# Patient Record
Sex: MALE | Race: WHITE | NOT HISPANIC OR LATINO | Employment: UNEMPLOYED | URBAN - METROPOLITAN AREA
[De-identification: names, ages, dates, MRNs, and addresses within clinical notes are randomized per-mention and may not be internally consistent; named-entity substitution may affect disease eponyms.]

---

## 2020-01-29 ENCOUNTER — APPOINTMENT (EMERGENCY)
Dept: CT IMAGING | Facility: HOSPITAL | Age: 25
DRG: 897 | End: 2020-01-29
Payer: COMMERCIAL

## 2020-01-29 ENCOUNTER — HOSPITAL ENCOUNTER (INPATIENT)
Facility: HOSPITAL | Age: 25
LOS: 1 days | Discharge: HOME | DRG: 897 | End: 2020-01-30
Attending: EMERGENCY MEDICINE | Admitting: INTERNAL MEDICINE
Payer: COMMERCIAL

## 2020-01-29 ENCOUNTER — APPOINTMENT (INPATIENT)
Dept: MRI IMAGING | Facility: HOSPITAL | Age: 25
DRG: 897 | End: 2020-01-29
Payer: COMMERCIAL

## 2020-01-29 DIAGNOSIS — Z87.898 HISTORY OF SEIZURE: ICD-10-CM

## 2020-01-29 DIAGNOSIS — F10.239 ALCOHOL WITHDRAWAL (HCC): Primary | ICD-10-CM

## 2020-01-29 PROBLEM — F17.200 NICOTINE DEPENDENCE: Status: ACTIVE | Noted: 2020-01-29

## 2020-01-29 PROBLEM — F11.11 HISTORY OF HEROIN ABUSE (HCC): Status: ACTIVE | Noted: 2020-01-29

## 2020-01-29 PROBLEM — F10.10 ALCOHOL ABUSE: Status: ACTIVE | Noted: 2020-01-29

## 2020-01-29 PROBLEM — F10.939 ALCOHOL WITHDRAWAL (HCC): Status: ACTIVE | Noted: 2020-01-29

## 2020-01-29 LAB
ALBUMIN SERPL BCP-MCNC: 4.3 G/DL (ref 3.5–5)
ALP SERPL-CCNC: 68 U/L (ref 46–116)
ALT SERPL W P-5'-P-CCNC: 25 U/L (ref 12–78)
ANION GAP SERPL CALCULATED.3IONS-SCNC: 10 MMOL/L (ref 4–13)
AST SERPL W P-5'-P-CCNC: 30 U/L (ref 5–45)
ATRIAL RATE: 76 BPM
BASOPHILS # BLD AUTO: 0.04 THOUSANDS/ΜL (ref 0–0.1)
BASOPHILS NFR BLD AUTO: 1 % (ref 0–1)
BILIRUB SERPL-MCNC: 1.6 MG/DL (ref 0.2–1)
BUN SERPL-MCNC: 9 MG/DL (ref 5–25)
CALCIUM SERPL-MCNC: 9.1 MG/DL (ref 8.3–10.1)
CHLORIDE SERPL-SCNC: 100 MMOL/L (ref 100–108)
CO2 SERPL-SCNC: 29 MMOL/L (ref 21–32)
CREAT SERPL-MCNC: 0.9 MG/DL (ref 0.6–1.3)
EOSINOPHIL # BLD AUTO: 0.15 THOUSAND/ΜL (ref 0–0.61)
EOSINOPHIL NFR BLD AUTO: 3 % (ref 0–6)
ERYTHROCYTE [DISTWIDTH] IN BLOOD BY AUTOMATED COUNT: 11.6 % (ref 11.6–15.1)
GFR SERPL CREATININE-BSD FRML MDRD: 119 ML/MIN/1.73SQ M
GLUCOSE SERPL-MCNC: 87 MG/DL (ref 65–140)
HCT VFR BLD AUTO: 41.8 % (ref 36.5–49.3)
HGB BLD-MCNC: 14.8 G/DL (ref 12–17)
IMM GRANULOCYTES # BLD AUTO: 0.01 THOUSAND/UL (ref 0–0.2)
IMM GRANULOCYTES NFR BLD AUTO: 0 % (ref 0–2)
LYMPHOCYTES # BLD AUTO: 0.78 THOUSANDS/ΜL (ref 0.6–4.47)
LYMPHOCYTES NFR BLD AUTO: 14 % (ref 14–44)
MAGNESIUM SERPL-MCNC: 1.8 MG/DL (ref 1.6–2.6)
MCH RBC QN AUTO: 31.2 PG (ref 26.8–34.3)
MCHC RBC AUTO-ENTMCNC: 35.4 G/DL (ref 31.4–37.4)
MCV RBC AUTO: 88 FL (ref 82–98)
MONOCYTES # BLD AUTO: 0.64 THOUSAND/ΜL (ref 0.17–1.22)
MONOCYTES NFR BLD AUTO: 11 % (ref 4–12)
NEUTROPHILS # BLD AUTO: 4.1 THOUSANDS/ΜL (ref 1.85–7.62)
NEUTS SEG NFR BLD AUTO: 71 % (ref 43–75)
NRBC BLD AUTO-RTO: 0 /100 WBCS
P AXIS: 52 DEGREES
PHOSPHATE SERPL-MCNC: 3.3 MG/DL (ref 2.7–4.5)
PLATELET # BLD AUTO: 201 THOUSANDS/UL (ref 149–390)
PMV BLD AUTO: 9.2 FL (ref 8.9–12.7)
POTASSIUM SERPL-SCNC: 3.3 MMOL/L (ref 3.5–5.3)
PR INTERVAL: 108 MS
PROT SERPL-MCNC: 7.8 G/DL (ref 6.4–8.2)
QRS AXIS: 93 DEGREES
QRSD INTERVAL: 84 MS
QT INTERVAL: 390 MS
QTC INTERVAL: 438 MS
RBC # BLD AUTO: 4.74 MILLION/UL (ref 3.88–5.62)
SODIUM SERPL-SCNC: 139 MMOL/L (ref 136–145)
T WAVE AXIS: 79 DEGREES
VENTRICULAR RATE: 76 BPM
WBC # BLD AUTO: 5.72 THOUSAND/UL (ref 4.31–10.16)

## 2020-01-29 PROCEDURE — 70553 MRI BRAIN STEM W/O & W/DYE: CPT

## 2020-01-29 PROCEDURE — 93010 ELECTROCARDIOGRAM REPORT: CPT | Performed by: INTERNAL MEDICINE

## 2020-01-29 PROCEDURE — 80053 COMPREHEN METABOLIC PANEL: CPT | Performed by: EMERGENCY MEDICINE

## 2020-01-29 PROCEDURE — 99223 1ST HOSP IP/OBS HIGH 75: CPT | Performed by: INTERNAL MEDICINE

## 2020-01-29 PROCEDURE — 83735 ASSAY OF MAGNESIUM: CPT | Performed by: EMERGENCY MEDICINE

## 2020-01-29 PROCEDURE — 85025 COMPLETE CBC W/AUTO DIFF WBC: CPT | Performed by: EMERGENCY MEDICINE

## 2020-01-29 PROCEDURE — 36415 COLL VENOUS BLD VENIPUNCTURE: CPT | Performed by: EMERGENCY MEDICINE

## 2020-01-29 PROCEDURE — 96374 THER/PROPH/DIAG INJ IV PUSH: CPT

## 2020-01-29 PROCEDURE — A9585 GADOBUTROL INJECTION: HCPCS | Performed by: PHYSICIAN ASSISTANT

## 2020-01-29 PROCEDURE — 96361 HYDRATE IV INFUSION ADD-ON: CPT

## 2020-01-29 PROCEDURE — 93005 ELECTROCARDIOGRAM TRACING: CPT

## 2020-01-29 PROCEDURE — 84100 ASSAY OF PHOSPHORUS: CPT | Performed by: EMERGENCY MEDICINE

## 2020-01-29 PROCEDURE — 70450 CT HEAD/BRAIN W/O DYE: CPT

## 2020-01-29 PROCEDURE — 99285 EMERGENCY DEPT VISIT HI MDM: CPT | Performed by: EMERGENCY MEDICINE

## 2020-01-29 PROCEDURE — 99285 EMERGENCY DEPT VISIT HI MDM: CPT

## 2020-01-29 RX ORDER — FOLIC ACID 1 MG/1
1 TABLET ORAL DAILY
Status: DISCONTINUED | OUTPATIENT
Start: 2020-01-30 | End: 2020-01-30 | Stop reason: HOSPADM

## 2020-01-29 RX ORDER — NICOTINE 21 MG/24HR
1 PATCH, TRANSDERMAL 24 HOURS TRANSDERMAL DAILY
Status: DISCONTINUED | OUTPATIENT
Start: 2020-01-30 | End: 2020-01-30 | Stop reason: HOSPADM

## 2020-01-29 RX ORDER — MULTIVITAMIN/IRON/FOLIC ACID 18MG-0.4MG
1 TABLET ORAL DAILY
Status: DISCONTINUED | OUTPATIENT
Start: 2020-01-29 | End: 2020-01-30 | Stop reason: HOSPADM

## 2020-01-29 RX ORDER — DIAZEPAM 5 MG/ML
10 INJECTION, SOLUTION INTRAMUSCULAR; INTRAVENOUS ONCE
Status: COMPLETED | OUTPATIENT
Start: 2020-01-29 | End: 2020-01-29

## 2020-01-29 RX ORDER — DIAZEPAM 5 MG/ML
5 INJECTION, SOLUTION INTRAMUSCULAR; INTRAVENOUS ONCE
Status: COMPLETED | OUTPATIENT
Start: 2020-01-29 | End: 2020-01-29

## 2020-01-29 RX ORDER — NICOTINE 21 MG/24HR
14 PATCH, TRANSDERMAL 24 HOURS TRANSDERMAL ONCE
Status: DISCONTINUED | OUTPATIENT
Start: 2020-01-29 | End: 2020-01-30 | Stop reason: HOSPADM

## 2020-01-29 RX ORDER — CHLORDIAZEPOXIDE HYDROCHLORIDE 25 MG/1
50 CAPSULE, GELATIN COATED ORAL ONCE
Status: COMPLETED | OUTPATIENT
Start: 2020-01-29 | End: 2020-01-29

## 2020-01-29 RX ORDER — CHLORDIAZEPOXIDE HYDROCHLORIDE 5 MG/1
10 CAPSULE, GELATIN COATED ORAL EVERY 6 HOURS SCHEDULED
Status: DISCONTINUED | OUTPATIENT
Start: 2020-01-29 | End: 2020-01-29

## 2020-01-29 RX ORDER — CHLORDIAZEPOXIDE HYDROCHLORIDE 5 MG/1
10 CAPSULE, GELATIN COATED ORAL EVERY 6 HOURS SCHEDULED
Status: DISCONTINUED | OUTPATIENT
Start: 2020-01-29 | End: 2020-01-30

## 2020-01-29 RX ORDER — THIAMINE MONONITRATE (VIT B1) 100 MG
100 TABLET ORAL DAILY
Status: DISCONTINUED | OUTPATIENT
Start: 2020-01-29 | End: 2020-01-30 | Stop reason: HOSPADM

## 2020-01-29 RX ORDER — NICOTINE 21 MG/24HR
14 PATCH, TRANSDERMAL 24 HOURS TRANSDERMAL ONCE
Status: DISCONTINUED | OUTPATIENT
Start: 2020-01-29 | End: 2020-01-29 | Stop reason: SDUPTHER

## 2020-01-29 RX ADMIN — FOLIC ACID 1 MG: 5 INJECTION, SOLUTION INTRAMUSCULAR; INTRAVENOUS; SUBCUTANEOUS at 12:59

## 2020-01-29 RX ADMIN — CHLORDIAZEPOXIDE HYDROCHLORIDE 10 MG: 5 CAPSULE ORAL at 15:08

## 2020-01-29 RX ADMIN — NICOTINE 14 MG: 14 PATCH TRANSDERMAL at 09:39

## 2020-01-29 RX ADMIN — THIAMINE HYDROCHLORIDE 100 MG: 100 INJECTION, SOLUTION INTRAMUSCULAR; INTRAVENOUS at 11:52

## 2020-01-29 RX ADMIN — GADOBUTROL 5 ML: 604.72 INJECTION INTRAVENOUS at 19:36

## 2020-01-29 RX ADMIN — CHLORDIAZEPOXIDE HYDROCHLORIDE 50 MG: 25 CAPSULE ORAL at 09:22

## 2020-01-29 RX ADMIN — DIAZEPAM 10 MG: 10 INJECTION, SOLUTION INTRAMUSCULAR; INTRAVENOUS at 09:22

## 2020-01-29 RX ADMIN — SODIUM CHLORIDE 1000 ML: 0.9 INJECTION, SOLUTION INTRAVENOUS at 09:25

## 2020-01-29 RX ADMIN — DIAZEPAM 5 MG: 10 INJECTION, SOLUTION INTRAMUSCULAR; INTRAVENOUS at 09:39

## 2020-01-29 RX ADMIN — Medication 1 TABLET: at 13:27
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
## 2020-01-29 NOTE — ED NOTES
Patient returned from x-ray, resting comfortably  Side rails up x 2, call light in reach        Gael Dill RN  01/29/20 8597

## 2020-01-29 NOTE — ED NOTES
1221 [facility] notified  [facility] Notice from [place] will be in to see patient         Alcira Hilton RN  01/29/20 4645

## 2020-01-30 ENCOUNTER — APPOINTMENT (INPATIENT)
Dept: NEUROLOGY | Facility: HOSPITAL | Age: 25
DRG: 897 | End: 2020-01-30
Payer: COMMERCIAL

## 2020-01-30 VITALS
BODY MASS INDEX: 20.4 KG/M2 | TEMPERATURE: 98 F | HEIGHT: 67 IN | OXYGEN SATURATION: 98 % | DIASTOLIC BLOOD PRESSURE: 70 MMHG | SYSTOLIC BLOOD PRESSURE: 111 MMHG | WEIGHT: 130 LBS | HEART RATE: 72 BPM | RESPIRATION RATE: 18 BRPM

## 2020-01-30 LAB
ANION GAP SERPL CALCULATED.3IONS-SCNC: 6 MMOL/L (ref 4–13)
BASOPHILS # BLD AUTO: 0.04 THOUSANDS/ΜL (ref 0–0.1)
BASOPHILS NFR BLD AUTO: 1 % (ref 0–1)
BUN SERPL-MCNC: 7 MG/DL (ref 5–25)
CALCIUM SERPL-MCNC: 8.9 MG/DL (ref 8.3–10.1)
CHLORIDE SERPL-SCNC: 106 MMOL/L (ref 100–108)
CO2 SERPL-SCNC: 28 MMOL/L (ref 21–32)
CREAT SERPL-MCNC: 0.73 MG/DL (ref 0.6–1.3)
EOSINOPHIL # BLD AUTO: 0.31 THOUSAND/ΜL (ref 0–0.61)
EOSINOPHIL NFR BLD AUTO: 7 % (ref 0–6)
ERYTHROCYTE [DISTWIDTH] IN BLOOD BY AUTOMATED COUNT: 11.8 % (ref 11.6–15.1)
GFR SERPL CREATININE-BSD FRML MDRD: 130 ML/MIN/1.73SQ M
GLUCOSE SERPL-MCNC: 96 MG/DL (ref 65–140)
HCT VFR BLD AUTO: 39 % (ref 36.5–49.3)
HGB BLD-MCNC: 13.5 G/DL (ref 12–17)
IMM GRANULOCYTES # BLD AUTO: 0.01 THOUSAND/UL (ref 0–0.2)
IMM GRANULOCYTES NFR BLD AUTO: 0 % (ref 0–2)
LYMPHOCYTES # BLD AUTO: 1.17 THOUSANDS/ΜL (ref 0.6–4.47)
LYMPHOCYTES NFR BLD AUTO: 28 % (ref 14–44)
MCH RBC QN AUTO: 30.8 PG (ref 26.8–34.3)
MCHC RBC AUTO-ENTMCNC: 34.6 G/DL (ref 31.4–37.4)
MCV RBC AUTO: 89 FL (ref 82–98)
MONOCYTES # BLD AUTO: 0.41 THOUSAND/ΜL (ref 0.17–1.22)
MONOCYTES NFR BLD AUTO: 10 % (ref 4–12)
NEUTROPHILS # BLD AUTO: 2.26 THOUSANDS/ΜL (ref 1.85–7.62)
NEUTS SEG NFR BLD AUTO: 54 % (ref 43–75)
NRBC BLD AUTO-RTO: 0 /100 WBCS
PLATELET # BLD AUTO: 161 THOUSANDS/UL (ref 149–390)
PMV BLD AUTO: 9.1 FL (ref 8.9–12.7)
POTASSIUM SERPL-SCNC: 3.3 MMOL/L (ref 3.5–5.3)
RBC # BLD AUTO: 4.38 MILLION/UL (ref 3.88–5.62)
SODIUM SERPL-SCNC: 140 MMOL/L (ref 136–145)
WBC # BLD AUTO: 4.2 THOUSAND/UL (ref 4.31–10.16)

## 2020-01-30 PROCEDURE — 95816 EEG AWAKE AND DROWSY: CPT

## 2020-01-30 PROCEDURE — 36415 COLL VENOUS BLD VENIPUNCTURE: CPT | Performed by: PHYSICIAN ASSISTANT

## 2020-01-30 PROCEDURE — 85025 COMPLETE CBC W/AUTO DIFF WBC: CPT | Performed by: PHYSICIAN ASSISTANT

## 2020-01-30 PROCEDURE — 80048 BASIC METABOLIC PNL TOTAL CA: CPT | Performed by: PHYSICIAN ASSISTANT

## 2020-01-30 PROCEDURE — 95819 EEG AWAKE AND ASLEEP: CPT | Performed by: PSYCHIATRY & NEUROLOGY

## 2020-01-30 PROCEDURE — 99239 HOSP IP/OBS DSCHRG MGMT >30: CPT | Performed by: INTERNAL MEDICINE

## 2020-01-30 PROCEDURE — 99223 1ST HOSP IP/OBS HIGH 75: CPT | Performed by: NURSE PRACTITIONER

## 2020-01-30 RX ORDER — LANOLIN ALCOHOL/MO/W.PET/CERES
100 CREAM (GRAM) TOPICAL DAILY
Qty: 30 TABLET | Refills: 0 | Status: SHIPPED | OUTPATIENT
Start: 2020-01-31 | End: 2020-04-11 | Stop reason: ALTCHOICE

## 2020-01-30 RX ORDER — CHLORDIAZEPOXIDE HYDROCHLORIDE 10 MG/1
CAPSULE, GELATIN COATED ORAL
Qty: 10 CAPSULE | Refills: 0 | Status: SHIPPED | OUTPATIENT
Start: 2020-01-30 | End: 2020-04-11 | Stop reason: ALTCHOICE

## 2020-01-30 RX ORDER — POTASSIUM CHLORIDE 20 MEQ/1
40 TABLET, EXTENDED RELEASE ORAL ONCE
Status: COMPLETED | OUTPATIENT
Start: 2020-01-30 | End: 2020-01-30

## 2020-01-30 RX ORDER — CHLORDIAZEPOXIDE HYDROCHLORIDE 5 MG/1
10 CAPSULE, GELATIN COATED ORAL EVERY 8 HOURS SCHEDULED
Status: DISCONTINUED | OUTPATIENT
Start: 2020-01-30 | End: 2020-01-30 | Stop reason: HOSPADM

## 2020-01-30 RX ORDER — FOLIC ACID 1 MG/1
1 TABLET ORAL DAILY
Qty: 30 TABLET | Refills: 0 | Status: SHIPPED | OUTPATIENT
Start: 2020-01-31 | End: 2020-04-11 | Stop reason: ALTCHOICE

## 2020-01-30 RX ADMIN — NICOTINE 1 PATCH: 21 PATCH, EXTENDED RELEASE TRANSDERMAL at 10:28

## 2020-01-30 RX ADMIN — POTASSIUM CHLORIDE 40 MEQ: 1500 TABLET, EXTENDED RELEASE ORAL at 12:41

## 2020-01-30 RX ADMIN — Medication 1 TABLET: at 10:27

## 2020-01-30 RX ADMIN — CHLORDIAZEPOXIDE HYDROCHLORIDE 10 MG: 5 CAPSULE ORAL at 05:55

## 2020-01-30 RX ADMIN — NICOTINE 14 MG: 14 PATCH TRANSDERMAL at 00:27

## 2020-01-30 RX ADMIN — CHLORDIAZEPOXIDE HYDROCHLORIDE 10 MG: 5 CAPSULE ORAL at 00:25

## 2020-01-30 RX ADMIN — FOLIC ACID 1 MG: 1 TABLET ORAL at 10:28

## 2020-01-30 RX ADMIN — THIAMINE HCL TAB 100 MG 100 MG: 100 TAB at 10:28

## 2020-01-30 NOTE — ED NOTES
Patient transported to [address] [place] [address], [address] [place] [address]  01/29/20 7834
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
## 2020-01-30 NOTE — ED NOTES
Pt woke up, asking about what he is waiting for and if he can go home  Pt explained that we are awaiting neurology consult  Pt interested in [facility], but worried his insurance will not pay if he goes that way  Pt walked to bathroom, given self-care items  Pt requests food and drink       Marlin Waldrop RN  01/30/20 7046
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
## 2020-01-30 NOTE — ED NOTES
Pt given 6am Librium  Resting again in bed, denies needing anything else at this time        Arabella Bell, PATRICIA  01/30/20 2091

## 2020-01-30 NOTE — UTILIZATION REVIEW
Initial Clinical Review    Admission: Date/Time/Statement: Inpatient Admission Orders (From admission, onward)     Ordered        01/29/20 1111  Inpatient Admission  Once                   Orders Placed This Encounter   Procedures    Inpatient Admission     Standing Status:   Standing     Number of Occurrences:   1     Order Specific Question:   Admitting Physician     Answer:   Devin Jose [[id number]]     Order Specific Question:   Level of Care     Answer:   Med Surg [[id number]]     Order Specific Question:   Estimated length of stay     Answer:   More than 2 Midnights     Order Specific Question:   Certification     Answer:   I certify that inpatient services are medically necessary for this patient for a duration of greater than two midnights  See H&P and MD Progress Notes for additional information about the patient's course of treatment  ED Arrival Information     Expected Arrival Acuity Means of Arrival Escorted By Service Admission Type    - 1/29/2020 08:27 Urgent Walk-In Family Member General Medicine Urgent    Arrival Complaint    seizure        Chief Complaint   Patient presents with    Alcoholic Seizure     Patient states he had seizure at 7am   He woke up feeling weird and confused and had a burning smell  Had a seizure abotu 5 years ago that he states was wellbutrin induced  Patient states he drinks a bottle of whiskey a day for several weeks and did not drink yesterday due to family trying to intervene in his drinking  Assessment/Plan: 26 yo male to ED from home admitted as Inpatient due to Alcohol withdrawal  Patient with PMHx of alcohol abuse, 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  Chose to participate in " Dry January" for 2 weeks  However, for prior 2 weeks ingesting 1 bottle of whiskey /day  Approximately 36 hours since last drink & participated in an intervention w his family   He awake smelling "burning" which occurred previously when he was on Wellbutrin & had a seizure; being confused & incoherent  This prompt him to seek ED eval  He is agitated, anxious with tremors  He has gone thru alcohol withdrawal before without seizure activity  Inpatient labs reveal elevated  Bilirubin  IN ED he received IV hydration,  IV Valium, & Librium & will begin a Librium taper  CIWA scores  Seizure precautions, consult Neurology & obtain EEG       ED Triage Vitals   Temperature Pulse Respirations Blood Pressure SpO2   01/29/20 0853 01/29/20 0853 01/29/20 0853 01/29/20 0853 01/29/20 0853   98 6 °F (37 °C) 76 18 136/82 97 %      Temp Source Heart Rate Source Patient Position - Orthostatic VS BP Location FiO2 (%)   01/29/20 2019 01/29/20 0930 01/29/20 0853 01/29/20 0853 --   Tympanic Monitor Lying Left arm       Pain Score       01/29/20 0853       No Pain        Wt Readings from Last 1 Encounters:   01/29/20 59 kg (130 lb)     Additional Vital Signs:   Date/Time  Temp  Pulse  Resp  BP  MAP (mmHg)  SpO2  O2 Device  Patient Position - Orthostatic VS   01/30/20 11:04:30  98 °F (36 7 °C)  72  18  111/70  84  98 %       01/30/20 1100              None (Room air)     01/30/20 1030    82  18  111/64    99 %  None (Room air)     01/30/20 0627    68    117/68    99 %       01/30/20 0030  98 4 °F (36 9 °C)  78  17  115/63    99 %  None (Room air)     01/29/20 2019  99 6 °F (37 6 °C)  70  20  132/76    99 %  None (Room air)  Lying   01/29/20 1618    85  18  121/73    97 %  None (Room air)  Lying   01/29/20 1347    76    118/76           01/29/20 1300    76    118/76    98 %       01/29/20 1230    80  19  109/78    99 %       01/29/20 1030    80  16  116/67    97 %  None (Room air)  Sitting   01/29/20 1000    83  14  121/79    97 %  None (Room air)  Sitting   01/29/20 0930    83  19  133/76    97 %  None (Room air)  Lying   Comment rows:   OBSERV: resting comfortably at 01/29/20 0930   01/29/20 0853  98 6 °F (37 °C)  76  18  136/82    97 %    Lying      Weights (last 14 days)     Date/Time  Weight  Weight Method  Height   01/29/20 0853  59 kg (130 lb)  Estimated  5' 7" (1 702 m)       Pertinent Labs/Diagnostic Test Results:   1/29 CIWA sores (AFTER MEDICATED IN ED) 1,1,6  Results from last 7 days   Lab Units 01/30/20  0649 01/29/20  0915   WBC Thousand/uL 4 20* 5 72   HEMOGLOBIN g/dL 13 5 14 8   HEMATOCRIT % 39 0 41 8   PLATELETS Thousands/uL 161 201   NEUTROS ABS Thousands/µL 2 26 4 10         Results from last 7 days   Lab Units 01/30/20  0650 01/29/20  0915   SODIUM mmol/L 140 139   POTASSIUM mmol/L 3 3* 3 3*   CHLORIDE mmol/L 106 100   CO2 mmol/L 28 29   ANION GAP mmol/L 6 10   BUN mg/dL 7 9   CREATININE mg/dL 0 73 0 90   EGFR ml/min/1 73sq m 130 119   CALCIUM mg/dL 8 9 9 1   MAGNESIUM mg/dL  --  1 8   PHOSPHORUS mg/dL  --  3 3     Results from last 7 days   Lab Units 01/29/20  0915   AST U/L 30   ALT U/L 25   ALK PHOS U/L 68   TOTAL PROTEIN g/dL 7 8   ALBUMIN g/dL 4 3   TOTAL BILIRUBIN mg/dL 1 60*         Results from last 7 days   Lab Units 01/30/20  0650 01/29/20  0915   GLUCOSE RANDOM mg/dL 96 87     1/29/2020  CT head without contrast   No acute intracranial abnormality     :EKG: Sinus rhythm with short DE  Rightward axis  Borderline ECG      ED Treatment:   Medication Administration from 01/29/2020 0827 to 01/30/2020 1043       Date/Time Order Dose Route Action     01/29/2020 0925 sodium chloride 0 9 % bolus 1,000 mL 1,000 mL Intravenous New Bag     01/29/2020 0922 diazepam (VALIUM) injection 10 mg 10 mg Intravenous Given     01/29/2020 0922 chlordiazePOXIDE (LIBRIUM) capsule 50 mg 50 mg Oral Given     01/30/2020 0046 nicotine (NICODERM CQ) 14 mg/24hr TD 24 hr patch 14 mg 14 mg Transdermal Patch Removed     01/29/2020 0939 nicotine (NICODERM CQ) 14 mg/24hr TD 24 hr patch 14 mg 14 mg Transdermal Medication Applied     01/29/2020 0939 diazepam (VALIUM) injection 5 mg 5 mg Intravenous Given     87/15/6795 9893 folic acid 1 mg in sodium chloride 0 9 % 50 mL IVPB 1 mg Intravenous New Bag     01/29/2020 1152 thiamine (VITAMIN B1) 100 mg in sodium chloride 0 9 % 50 mL IVPB 100 mg Intravenous New Bag     01/30/2020 1028 thiamine (VITAMIN B1) tablet 100 mg 100 mg Oral Given     01/29/2020 1345 thiamine (VITAMIN B1) tablet 100 mg 100 mg Oral Not Given     15/35/6048 2838 folic acid (FOLVITE) tablet 1 mg 1 mg Oral Given     01/30/2020 1027 multivitamin-minerals (CENTRUM ADULTS) tablet 1 tablet 1 tablet Oral Given     01/29/2020 1327 multivitamin-minerals (CENTRUM ADULTS) tablet 1 tablet 1 tablet Oral Given     01/29/2020 1508 chlordiazePOXIDE (LIBRIUM) capsule 10 mg 10 mg Oral Not Given     01/30/2020 1028 nicotine (NICODERM CQ) 21 mg/24 hr TD 24 hr patch 1 patch 1 patch Transdermal Medication Applied     01/30/2020 0555 chlordiazePOXIDE (LIBRIUM) capsule 10 mg 10 mg Oral Given     01/30/2020 0025 chlordiazePOXIDE (LIBRIUM) capsule 10 mg 10 mg Oral Given     01/29/2020 1508 chlordiazePOXIDE (LIBRIUM) capsule 10 mg 10 mg Oral Given     01/29/2020 1936 Gadobutrol injection (SINGLE-DOSE) SOLN 5 mL 5 mL Intravenous Given     01/30/2020 0027 nicotine (NICODERM CQ) 14 mg/24hr TD 24 hr patch 14 mg 14 mg Transdermal Medication Applied        History reviewed  No pertinent past medical history    Present on Admission:   Alcohol withdrawal ([place] [address] )   Nicotine dependence   History of heroin abuse ([place] [address] )      Admitting Diagnosis: Alcohol withdrawal ([place] [address] ) [F10 239]  Seizure ([place] [address] ) [R56 9]  History of seizure [Z87 898]  Age/Sex: 25 y o  male  Admission Orders:  [facility]  Continuous pulse oximetry  Seizure precautions  eeg  CIWA scoring  Vitals routine  scd  Scheduled Medications:    Medications:  chlordiazePOXIDE 10 mg Oral [address]   folic acid 1 mg Oral Daily   multivitamin-minerals 1 tablet Oral Daily   nicotine 14 mg Transdermal Once   nicotine 1 patch Transdermal Daily   thiamine 100 mg Oral Daily     Continuous IV Infusions:     PRN Meds:     Network Utilization Review Department  [email or web address]  ATTENTION: Please call with any questions or concerns to [phone] and carefully listen to the prompts so that you are directed to the right person  All voicemails are confidential   Eva Zimmer all requests for admission clinical reviews, approved or denied determinations and any other requests to dedicated fax number below belonging to the campus where the patient is receiving treatment   List of dedicated fax numbers for the Facilities:  [address] DENIALS (Administrative/Medical Necessity) [phone]   [address] (Maternity/NICU/Pediatrics) [phone]   Aaliyah Parikh [phone]   Trice Figueroa [phone]   Danielle Evens [phone]   Fabian Pelaez [phone]   [address] [place] [address] [phone]   [facility]  [phone]   [address] [facility]  [address] [facility] [address] [facility] [phone]

## 2020-01-30 NOTE — ED NOTES
Pt sleeping  Self repositions  Respirations visible  No grimacing noted on face       Ashley Garcia RN  01/30/20 1040

## 2020-01-30 NOTE — ED NOTES
Pt resting in bed  Respirations visible  Arouses to name  Denies needing anything new at this time        Sebastian Stuart, RN  01/30/20 5373

## 2020-01-30 NOTE — SOCIAL WORK
LOS: 1 Patient is not a 30 day re admission or a Medicare Bundled patient    Met with patient and reviewed the discharge planning process including identifying help at home and patient preference for dischgare  Patient resides with a roommate in a 1st floor apartment  He is independent of ADL's and uses no assistive device  He is employed and reports [employer], instructed him to bring card in as he is lisited as self pay  He uses [facility] on [address]  Patient reports a 21 day inpatient drug rehab program at [facility] years ago  Discussed referral to [facility] and he is not interested in rehab and wants to go home

## 2020-01-30 NOTE — PLAN OF CARE
Problem: Potential for Falls  Goal: Patient will remain free of falls  Description  INTERVENTIONS:  - Assess patient frequently for physical needs  -  Identify cognitive and physical deficits and behaviors that affect risk of falls    -  Wall fall precautions as indicated by assessment   - Educate patient/family on patient safety including physical limitations  - Instruct patient to call for assistance with activity based on assessment  - Modify environment to reduce risk of injury  - Consider OT/PT consult to assist with strengthening/mobility  Outcome: Progressing     Problem: DISCHARGE PLANNING  Goal: Discharge to home or other facility with appropriate resources  Description  INTERVENTIONS:  - Identify barriers to discharge w/patient and caregiver  - Arrange for needed discharge resources and transportation as appropriate  - Identify discharge learning needs (meds, wound care, etc )  - Arrange for interpretive services to assist at discharge as needed  - Refer to Case Management Department for coordinating discharge planning if the patient needs post-hospital services based on physician/advanced practitioner order or complex needs related to functional status, cognitive ability, or social support system  Outcome: Progressing

## 2020-02-14 NOTE — UTILIZATION REVIEW
Initial Clinical Review    Admission: Date/Time/Statement:   Inpatient Admission Orders (From admission, onward)     Ordered        01/29/20 1111  Inpatient Admission  Once                   Orders Placed This Encounter   Procedures    Inpatient Admission     Standing Status:   Standing     Number of Occurrences:   1     Order Specific Question:   Admitting Physician     Answer:   Davida Arcos [[id number]]     Order Specific Question:   Level of Care     Answer:   Med Surg [[id number]]     Order Specific Question:   Estimated length of stay     Answer:   More than 2 Midnights     Order Specific Question:   Certification     Answer:   I certify that inpatient services are medically necessary for this patient for a duration of greater than two midnights  See H&P and MD Progress Notes for additional information about the patient's course of treatment  ED Arrival Information     Expected Arrival Acuity Means of Arrival Escorted By Service Admission Type    - 1/29/2020 08:27 Urgent Walk-In Family Member General Medicine Urgent    Arrival Complaint    seizure        Chief Complaint   Patient presents with    Alcoholic Seizure     Patient states he had seizure at 7am   He woke up feeling weird and confused and had a burning smell  Had a seizure abotu 5 years ago that he states was wellbutrin induced  Patient states he drinks a bottle of whiskey a day for several weeks and did not drink yesterday due to family trying to intervene in his drinking  Assessment/Plan: 24 yo male to ED from home admitted as Inpatient due to Alcohol withdrawal  Patient with PMHx of alcohol abuse, 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  Chose to participate in " Dry January" for 2 weeks  However, for prior 2 weeks ingesting 1 bottle of whiskey /day  Approximately 36 hours since last drink & participated in an intervention w his family   He awake smelling "burning" which occurred previously when he was on Wellbutrin & had a seizure; being confused & incoherent  This prompt him to seek ED eval  He is agitated, anxious with tremors  He has gone thru alcohol withdrawal before without seizure activity  Inpatient labs reveal elevated  Bilirubin  IN ED he received IV hydration,  IV Valium, & Librium & will begin a Librium taper  CIWA scores  Seizure precautions, consult Neurology & obtain EEG       ED Triage Vitals   Temperature Pulse Respirations Blood Pressure SpO2   01/29/20 0853 01/29/20 0853 01/29/20 0853 01/29/20 0853 01/29/20 0853   98 6 °F (37 °C) 76 18 136/82 97 %      Temp Source Heart Rate Source Patient Position - Orthostatic VS BP Location FiO2 (%)   01/29/20 2019 01/29/20 0930 01/29/20 0853 01/29/20 0853 --   Tympanic Monitor Lying Left arm       Pain Score       01/29/20 0853       No Pain          Wt Readings from Last 1 Encounters:   01/29/20 59 kg (130 lb)     Additional Vital Signs:   Date/Time  Temp  Pulse  Resp  BP  MAP (mmHg)  SpO2  O2 Device  Patient Position - Orthostatic VS   01/30/20 11:04:30  98 °F (36 7 °C)  72  18  111/70  84  98 %       01/30/20 1100              None (Room air)     01/30/20 1030    82  18  111/64    99 %  None (Room air)     01/30/20 0627    68    117/68    99 %       01/30/20 0030  98 4 °F (36 9 °C)  78  17  115/63    99 %  None (Room air)     01/29/20 2019  99 6 °F (37 6 °C)  70  20  132/76    99 %  None (Room air)  Lying   01/29/20 1618    85  18  121/73    97 %  None (Room air)  Lying   01/29/20 1347    76    118/76           01/29/20 1300    76    118/76    98 %       01/29/20 1230    80  19  109/78    99 %       01/29/20 1030    80  16  116/67    97 %  None (Room air)  Sitting   01/29/20 1000    83  14  121/79    97 %  None (Room air)  Sitting   01/29/20 0930    83  19  133/76    97 %  None (Room air)  Lying   Comment rows:   OBSERV: resting comfortably at 01/29/20 0930   01/29/20 0853  98 6 °F (37 °C)  76  18  136/82    97 %    Lying      Weights (last 14 days)     Date/Time  Weight  Weight Method  Height   01/29/20 0853  59 kg (130 lb)  Estimated  5' 7" (1 702 m)       Pertinent Labs/Diagnostic Test Results:   1/29 CIWA sores (AFTER MEDICATED IN ED) 1,1,6                          1/29/2020  CT head without contrast   No acute intracranial abnormality     :EKG: Sinus rhythm with short OH  Rightward axis  Borderline ECG      ED Treatment:   Medication Administration from 01/29/2020 0827 to 01/30/2020 1043       Date/Time Order Dose Route Action     01/29/2020 0925 sodium chloride 0 9 % bolus 1,000 mL 1,000 mL Intravenous New Bag     01/29/2020 0922 diazepam (VALIUM) injection 10 mg 10 mg Intravenous Given     01/29/2020 0922 chlordiazePOXIDE (LIBRIUM) capsule 50 mg 50 mg Oral Given     01/30/2020 0046 nicotine (NICODERM CQ) 14 mg/24hr TD 24 hr patch 14 mg 14 mg Transdermal Patch Removed     01/29/2020 0939 nicotine (NICODERM CQ) 14 mg/24hr TD 24 hr patch 14 mg 14 mg Transdermal Medication Applied     01/29/2020 0939 diazepam (VALIUM) injection 5 mg 5 mg Intravenous Given     38/56/7850 0015 folic acid 1 mg in sodium chloride 0 9 % 50 mL IVPB 1 mg Intravenous New Bag     01/29/2020 1152 thiamine (VITAMIN B1) 100 mg in sodium chloride 0 9 % 50 mL IVPB 100 mg Intravenous New Bag     01/30/2020 1028 thiamine (VITAMIN B1) tablet 100 mg 100 mg Oral Given     01/29/2020 1345 thiamine (VITAMIN B1) tablet 100 mg 100 mg Oral Not Given     33/81/2162 1739 folic acid (FOLVITE) tablet 1 mg 1 mg Oral Given     01/30/2020 1027 multivitamin-minerals (CENTRUM ADULTS) tablet 1 tablet 1 tablet Oral Given     01/29/2020 1327 multivitamin-minerals (CENTRUM ADULTS) tablet 1 tablet 1 tablet Oral Given     01/29/2020 1508 chlordiazePOXIDE (LIBRIUM) capsule 10 mg 10 mg Oral Not Given     01/30/2020 1028 nicotine (NICODERM CQ) 21 mg/24 hr TD 24 hr patch 1 patch 1 patch Transdermal Medication Applied     01/30/2020 0555 chlordiazePOXIDE (LIBRIUM) capsule 10 mg 10 mg Oral Given     01/30/2020 0025 chlordiazePOXIDE (LIBRIUM) capsule 10 mg 10 mg Oral Given     01/29/2020 1508 chlordiazePOXIDE (LIBRIUM) capsule 10 mg 10 mg Oral Given     01/29/2020 1936 Gadobutrol injection (SINGLE-DOSE) SOLN 5 mL 5 mL Intravenous Given     01/30/2020 0027 nicotine (NICODERM CQ) 14 mg/24hr TD 24 hr patch 14 mg 14 mg Transdermal Medication Applied        History reviewed  No pertinent past medical history  Present on Admission:   Alcohol withdrawal ([facility] )   Nicotine dependence   History of heroin abuse ([place] [address] )      Admitting Diagnosis: Alcohol withdrawal ([place] [address] ) [F10 239]  Seizure ([place] [address] ) [R56 9]  History of seizure [Z87 898]  Age/Sex: 25 y o  male  Admission Orders:  [facility]  Continuous pulse oximetry  Seizure precautions  eeg  CIWA scoring  Vitals routine  scd  Scheduled Medications:    Medications:  chlordiazePOXIDE 10 mg Oral [address] & intermediate   folic acid 1 mg Oral Daily   multivitamin-minerals 1 tablet Oral Daily   nicotine 14 mg Transdermal Once   nicotine 1 patch Transdermal Daily   thiamine 100 mg Oral Daily     Continuous IV Infusions:    No current facility-administered medications for this encounter  PRN Meds:    No current facility-administered medications for this encounter  Network Utilization Review Department  [email or web address]  ATTENTION: Please call with any questions or concerns to [phone] and carefully listen to the prompts so that you are directed to the right person  All voicemails are confidential   Eva Zimmer all requests for admission clinical reviews, approved or denied determinations and any other requests to dedicated fax number below belonging to the campus where the patient is receiving treatment   List of dedicated fax numbers for the Facilities:  [address] DENIALS (Administrative/Medical Necessity) [phone]   [address] (Maternity/NICU/Pediatrics) [phone]   Aaliyah Comas [phone]   Cristobal Castaneda [place] [phone]   Anish Solomon [phone]   [address]  [phone]   1205 [facility] 1525 [facility] [phone]   [facility]  [phone]   220[address] [facility]  [phone]   [address] [place] [address] 1000 [address] [facility] [phone]       Notification of Discharge  This is a Notification of Discharge from our facility [address]  Please be advised that this patient has been discharge from our facility  Below you will find the admission and discharge date and time including the patients disposition  PRESENTATION DATE: 1/29/2020  8:39 AM  OBS ADMISSION DATE:   IP ADMISSION DATE: 1/29/20 1111   DISCHARGE DATE: 1/30/2020  5:19 PM  DISPOSITION: Home/Self Care Home/Self Care   Admission Orders listed below:  Admission Orders (From admission, onward)     Ordered        01/29/20 1111  Inpatient Admission  Once                   Please contact the UR Department if additional information is required to close this patient's authorization/case  [facility] Utilization Review Department  Main: [phone] x carefully listen to the prompts  All voicemails are confidential   [email or web address]  Send all requests for admission clinical reviews, approved or denied determinations and any other requests to dedicated fax number below belonging to the campus where the patient is receiving treatment   List of dedicated fax numbers:  [address] [facility] [address] DENIALS (Administrative/Medical Necessity) [phone]   [address] (Maternity/NICU/Pediatrics) [phone]   Nestor Beard [phone]   Gertrudis De La Cruz [phone]   [facility] [place] [address] Annita Estyahir 75 [phone]   [facility] [phone]   [address] [place] [address] [phone]

## 2020-02-25 ENCOUNTER — TELEPHONE (OUTPATIENT)
Dept: NEUROLOGY | Facility: CLINIC | Age: 25
End: 2020-02-25

## 2020-02-25 NOTE — TELEPHONE ENCOUNTER
----- Message from Antoinette Chadwick sent at 1/31/2020  2:08 PM EST -----  Regarding: FW: HFU      ----- Message -----  From: SHANNAN Hernadez  Sent: 1/30/2020   4:33 PM EST  To: Neurology [place] [facility]  Subject: HFU                                              Diagnosis/Reason for follow-up: aura, h/o provoked seizure in the setting of Wellbutrin use, ADHD, h/o substance and alcohol abuse  Subspecialty for follow-up:  general  Existing neurologist: none  Recommended timing for follow-up: within 4-6 weeks  Tests/Labs/Imaging ordered: Routine EEG outpatient  AP/Attending: either  Additional notes: Thank You so much!

## 2020-04-11 ENCOUNTER — HOSPITAL ENCOUNTER (EMERGENCY)
Facility: HOSPITAL | Age: 25
Discharge: HOME | End: 2020-04-11
Attending: EMERGENCY MEDICINE | Admitting: EMERGENCY MEDICINE

## 2020-04-11 VITALS
BODY MASS INDEX: 20.36 KG/M2 | TEMPERATURE: 98 F | HEART RATE: 84 BPM | DIASTOLIC BLOOD PRESSURE: 84 MMHG | RESPIRATION RATE: 18 BRPM | SYSTOLIC BLOOD PRESSURE: 127 MMHG | OXYGEN SATURATION: 99 % | WEIGHT: 130 LBS

## 2020-04-11 DIAGNOSIS — F10.230 ALCOHOL WITHDRAWAL SYNDROME WITHOUT COMPLICATION (HCC): Primary | ICD-10-CM

## 2020-04-11 LAB
ALBUMIN SERPL BCP-MCNC: 4.2 G/DL (ref 3.5–5)
ALP SERPL-CCNC: 72 U/L (ref 46–116)
ALT SERPL W P-5'-P-CCNC: 85 U/L (ref 12–78)
ANION GAP SERPL CALCULATED.3IONS-SCNC: 9 MMOL/L (ref 4–13)
AST SERPL W P-5'-P-CCNC: 91 U/L (ref 5–45)
BILIRUB SERPL-MCNC: 1 MG/DL (ref 0.2–1)
BUN SERPL-MCNC: 7 MG/DL (ref 5–25)
CALCIUM SERPL-MCNC: 8.9 MG/DL (ref 8.3–10.1)
CHLORIDE SERPL-SCNC: 100 MMOL/L (ref 100–108)
CO2 SERPL-SCNC: 26 MMOL/L (ref 21–32)
CREAT SERPL-MCNC: 0.81 MG/DL (ref 0.6–1.3)
ETHANOL EXG-MCNC: 0 MG/DL
GFR SERPL CREATININE-BSD FRML MDRD: 124 ML/MIN/1.73SQ M
GLUCOSE SERPL-MCNC: 90 MG/DL (ref 65–140)
POTASSIUM SERPL-SCNC: 3.8 MMOL/L (ref 3.5–5.3)
PROT SERPL-MCNC: 7.7 G/DL (ref 6.4–8.2)
SODIUM SERPL-SCNC: 135 MMOL/L (ref 136–145)

## 2020-04-11 PROCEDURE — 96360 HYDRATION IV INFUSION INIT: CPT

## 2020-04-11 PROCEDURE — 36415 COLL VENOUS BLD VENIPUNCTURE: CPT | Performed by: PHYSICIAN ASSISTANT

## 2020-04-11 PROCEDURE — 80053 COMPREHEN METABOLIC PANEL: CPT | Performed by: PHYSICIAN ASSISTANT

## 2020-04-11 PROCEDURE — 99284 EMERGENCY DEPT VISIT MOD MDM: CPT | Performed by: PHYSICIAN ASSISTANT

## 2020-04-11 PROCEDURE — 82075 ASSAY OF BREATH ETHANOL: CPT | Performed by: PHYSICIAN ASSISTANT

## 2020-04-11 PROCEDURE — 99284 EMERGENCY DEPT VISIT MOD MDM: CPT

## 2020-04-11 RX ORDER — CHLORDIAZEPOXIDE HYDROCHLORIDE 25 MG/1
50 CAPSULE, GELATIN COATED ORAL ONCE
Status: COMPLETED | OUTPATIENT
Start: 2020-04-11 | End: 2020-04-11

## 2020-04-11 RX ORDER — CHLORDIAZEPOXIDE HYDROCHLORIDE 25 MG/1
CAPSULE, GELATIN COATED ORAL
Qty: 30 CAPSULE | Refills: 0 | Status: SHIPPED | OUTPATIENT
Start: 2020-04-11

## 2020-04-11 RX ADMIN — CHLORDIAZEPOXIDE HYDROCHLORIDE 50 MG: 25 CAPSULE ORAL at 12:33

## 2020-04-11 RX ADMIN — SODIUM CHLORIDE 1000 ML: 0.9 INJECTION, SOLUTION INTRAVENOUS at 11:34

## 2022-08-07 ENCOUNTER — HOSPITAL ENCOUNTER (EMERGENCY)
Facility: HOSPITAL | Age: 27
Discharge: HOME | End: 2022-08-07
Attending: EMERGENCY MEDICINE

## 2022-08-07 VITALS
DIASTOLIC BLOOD PRESSURE: 70 MMHG | HEART RATE: 69 BPM | TEMPERATURE: 98.1 F | SYSTOLIC BLOOD PRESSURE: 117 MMHG | OXYGEN SATURATION: 98 % | RESPIRATION RATE: 18 BRPM

## 2022-08-07 DIAGNOSIS — F19.10 SUBSTANCE ABUSE (HCC): Primary | ICD-10-CM

## 2022-08-07 LAB
AMPHETAMINES SERPL QL SCN: NEGATIVE
BARBITURATES UR QL: NEGATIVE
BENZODIAZ UR QL: NEGATIVE
COCAINE UR QL: NEGATIVE
ETHANOL EXG-MCNC: 0 MG/DL
FLUAV RNA RESP QL NAA+PROBE: NEGATIVE
FLUBV RNA RESP QL NAA+PROBE: NEGATIVE
METHADONE UR QL: NEGATIVE
OPIATES UR QL SCN: NEGATIVE
OXYCODONE+OXYMORPHONE UR QL SCN: NEGATIVE
PCP UR QL: NEGATIVE
RSV RNA RESP QL NAA+PROBE: NEGATIVE
SARS-COV-2 RNA RESP QL NAA+PROBE: NEGATIVE
THC UR QL: NEGATIVE

## 2022-08-07 PROCEDURE — 0241U HB NFCT DS VIR RESP RNA 4 TRGT: CPT | Performed by: PHYSICIAN ASSISTANT

## 2022-08-07 PROCEDURE — 99282 EMERGENCY DEPT VISIT SF MDM: CPT | Performed by: PHYSICIAN ASSISTANT

## 2022-08-07 PROCEDURE — 99283 EMERGENCY DEPT VISIT LOW MDM: CPT

## 2022-08-07 PROCEDURE — 82075 ASSAY OF BREATH ETHANOL: CPT | Performed by: PHYSICIAN ASSISTANT

## 2022-08-07 PROCEDURE — 80307 DRUG TEST PRSMV CHEM ANLYZR: CPT | Performed by: PHYSICIAN ASSISTANT

## 2022-08-07 NOTE — ED PROVIDER NOTES
History  Chief Complaint   Patient presents with    Drug Problem     Patient is a 31 y/o M that presents to the ED for rehab  He states he has been using fentanyl daily and meth occasionally  He is currently homeless  He has been to rehab in the past   He denies SI, but states he has negative thoughts off and on  He does not feel like he needs a psychiatrist at this time and just wants to go to rehab  Abi was notified by RN  History provided by:  Patient  Drug Problem  Severity:  Moderate  Timing:  Constant  Progression:  Worsening  Chronicity:  Chronic  Associated symptoms: no abdominal pain, no chest pain, no congestion, no cough, no diarrhea, no fatigue, no fever, no headaches, no loss of consciousness, no myalgias, no nausea, no rash, no rhinorrhea, no shortness of breath, no vomiting and no wheezing        Prior to Admission Medications   Prescriptions Last Dose Informant Patient Reported? Taking?   chlordiazePOXIDE (LIBRIUM) 25 mg capsule   No No   Simg PO tid x 3d, then 25mg PO tid x 3d, then 25mg PO q8hr prn withdrawal      Facility-Administered Medications: None       Past Medical History:   Diagnosis Date    Alcohol abuse        No past surgical history on file  Family History   Problem Relation Age of Onset    Hypertension Mother     Hypertension Father      I have reviewed and agree with the history as documented  E-Cigarette/Vaping    E-Cigarette Use Never User      E-Cigarette/Vaping Substances    Nicotine No     Flavoring No      Social History     Tobacco Use    Smoking status: Current Some Day Smoker    Smokeless tobacco: Never Used   Vaping Use    Vaping Use: Never used   Substance Use Topics    Alcohol use: Not Currently     Comment: Hx- patient states drinking bottle of whiskey daily for several weeks    Drug use: Yes     Types: Fentanyl       Review of Systems   Constitutional: Negative for chills, fatigue and fever     HENT: Negative for congestion and rhinorrhea  Eyes: Negative for visual disturbance  Respiratory: Negative for cough, shortness of breath and wheezing  Cardiovascular: Negative for chest pain  Gastrointestinal: Negative for abdominal pain, diarrhea, nausea and vomiting  Genitourinary: Negative for dysuria  Musculoskeletal: Negative for back pain, myalgias and neck pain  Skin: Negative for color change and rash  Neurological: Negative for dizziness, loss of consciousness, weakness, light-headedness, numbness and headaches  Psychiatric/Behavioral: Negative for confusion  All other systems reviewed and are negative  Physical Exam  Physical Exam  Vitals and nursing note reviewed  Constitutional:       General: He is not in acute distress  Appearance: Normal appearance  He is well-developed, well-groomed and normal weight  He is not ill-appearing or diaphoretic  HENT:      Head: Normocephalic and atraumatic  Right Ear: External ear normal       Left Ear: External ear normal       Nose: Nose normal       Mouth/Throat:      Mouth: Mucous membranes are moist       Pharynx: Oropharynx is clear  Eyes:      Conjunctiva/sclera: Conjunctivae normal    Cardiovascular:      Rate and Rhythm: Normal rate and regular rhythm  Heart sounds: Normal heart sounds  No murmur heard  Pulmonary:      Effort: Pulmonary effort is normal       Breath sounds: Normal breath sounds  No wheezing, rhonchi or rales  Abdominal:      General: Abdomen is flat  Bowel sounds are normal       Palpations: Abdomen is soft  Tenderness: There is no abdominal tenderness  Musculoskeletal:         General: Normal range of motion  Cervical back: Normal range of motion  Right lower leg: No edema  Left lower leg: No edema  Skin:     General: Skin is warm and dry  Coloration: Skin is not jaundiced or pale  Findings: No rash  Neurological:      General: No focal deficit present        Mental Status: He is alert and oriented to person, place, and time  GCS: GCS eye subscore is 4  GCS verbal subscore is 5  GCS motor subscore is 6  Cranial Nerves: No cranial nerve deficit  Motor: No weakness  Gait: Gait is intact  Psychiatric:         Mood and Affect: Mood normal          Speech: Speech normal          Behavior: Behavior is cooperative  Thought Content: Thought content does not include homicidal or suicidal ideation  Cognition and Memory: Cognition normal          Vital Signs  ED Triage Vitals   Temperature Pulse Respirations Blood Pressure SpO2   08/07/22 1558 08/07/22 1555 08/07/22 1555 08/07/22 1555 08/07/22 1555   98 1 °F (36 7 °C) 69 18 117/70 98 %      Temp Source Heart Rate Source Patient Position - Orthostatic VS BP Location FiO2 (%)   08/07/22 1558 08/07/22 1555 08/07/22 1555 08/07/22 1555 --   Oral Monitor Lying Left arm       Pain Score       --                  Vitals:    08/07/22 1555   BP: 117/70   Pulse: 69   Patient Position - Orthostatic VS: Lying         Visual Acuity      ED Medications  Medications - No data to display    Diagnostic Studies  Results Reviewed     Procedure Component Value Units Date/Time    FLU/RSV/COVID - if FLU/RSV clinically relevant [[id number]]  (Normal) Collected: 08/07/22 1657    Lab Status: Final result Specimen: Nares from Nose Updated: 08/07/22 1739     SARS-CoV-2 Negative     INFLUENZA A PCR Negative     INFLUENZA B PCR Negative     RSV PCR Negative    Narrative:      FOR PEDIATRIC PATIENTS - copy/paste COVID Guidelines URL to browser: [email or web address]  InboundWriterx    SARS-CoV-2 assay is a Nucleic Acid Amplification assay intended for the  qualitative detection of nucleic acid from SARS-CoV-2 in nasopharyngeal  swabs  Results are for the presumptive identification of SARS-CoV-2 RNA      Positive results are indicative of infection with SARS-CoV-2, the virus  causing COVID-19, but do not rule out bacterial infection or co-infection  with other viruses  Laboratories within the [place] and its  territories are required to report all positive results to the appropriate  public health authorities  Negative results do not preclude SARS-CoV-2  infection and should not be used as the sole basis for treatment or other  patient management decisions  Negative results must be combined with  clinical observations, patient history, and epidemiological information  This test has not been FDA cleared or approved  This test has been authorized by FDA under an Emergency Use Authorization  (EUA)  This test is only authorized for the duration of time the  declaration that circumstances exist justifying the authorization of the  emergency use of an in vitro diagnostic tests for detection of SARS-CoV-2  virus and/or diagnosis of COVID-19 infection under section 564(b)(1) of  the Act, 21 U  S C  898KUH-0(H)(8), unless the authorization is terminated  or revoked sooner  The test has been validated but independent review by FDA  and CLIA is pending  Test performed using [employer] GeneXpert: This RT-PCR assay targets N2,  a region unique to SARS-CoV-2  A conserved region in the E-gene was chosen  for pan-Sarbecovirus detection which includes SARS-CoV-2  Rapid drug screen, urine [[id number]]  (Normal) Collected: 08/07/22 1713    Lab Status: Final result Specimen: Urine, Clean Catch Updated: 08/07/22 1730     Amph/Meth UR Negative     Barbiturate Ur Negative     Benzodiazepine Urine Negative     Cocaine Urine Negative     Methadone Urine Negative     Opiate Urine Negative     PCP Ur Negative     THC Urine Negative     Oxycodone Urine Negative    Narrative:      FOR MEDICAL PURPOSES ONLY  IF CONFIRMATION NEEDED PLEASE CONTACT THE LAB WITHIN 5 DAYS      Drug Screen Cutoff Levels:  AMPHETAMINE/METHAMPHETAMINES  1000 ng/mL  BARBITURATES     200 ng/mL  BENZODIAZEPINES     200 ng/mL  COCAINE      300 ng/mL  METHADONE      300 ng/mL  OPIATES      300 ng/mL  PHENCYCLIDINE     25 ng/mL  THC       50 ng/mL  OXYCODONE      100 ng/mL    POCT alcohol breath test [[id number]]  (Normal) Resulted: 08/07/22 1715    Lab Status: Final result Updated: 08/07/22 1716     EXTBreath Alcohol 0 000                 No orders to display              Procedures  Procedures         ED Course  ED Course as of 08/07/22 2009   Sun Aug 07, 2022   1957 Patient spoke with [facility] they said they will be able to to help him tomorrow once they talk to [facility]  Patient does not want to wait in the ER until tomorrow  [facility] does have patient's contact number  MDM  Number of Diagnoses or Management Options  Substance abuse ([facility] ): established and worsening  Diagnosis management comments: Patient with h/o substance abuse, requesting rehab  [facility] spoke with patient and is unable to help him until Monday  Patient would prefer to be discharged, he states his friend can pick him up  [facility] does have a phone number to contact patient  No SI/HI, patient stable for discharge          Amount and/or Complexity of Data Reviewed  Clinical lab tests: reviewed and ordered  Discuss the patient with other providers: yes Rashad Valencia, from [facility]  )    Patient Progress  Patient progress: stable      Disposition  Final diagnoses:   Substance abuse ([facility] )     Time reflects when diagnosis was documented in both MDM as applicable and the Disposition within this note     Time User Action Codes Description Comment    8/7/2022  4:41 PM [place] Graft Add [F19 10] Drug abuse ([facility] )     8/7/2022  4:41 PM [place] Graft Add [F19 10] Substance abuse ([facility] )     8/7/2022  4:41 PM [place] Graft Modify [F19 10] Substance abuse ([facility] )     8/7/2022  4:41 PM [place] Graft Remove [F19 10] Drug abuse [facility])       ED Disposition     ED Disposition   Discharge    Condition   Stable    Date/Time   Sun Aug 7, 2022  7:57 PM    Comment   Miquel Vital discharge to home/self care  Follow-up Information    None         Discharge Medication List as of 8/7/2022  7:57 PM      CONTINUE these medications which have NOT CHANGED    Details   chlordiazePOXIDE (LIBRIUM) 25 mg capsule 50mg PO tid x 3d, then 25mg PO tid x 3d, then 25mg PO q8hr prn withdrawal, Normal             No discharge procedures on file      PDMP Review       Value Time User    PDMP Reviewed  Yes 1/29/2020 12:50 PM Christine Rausch PA-C          ED Provider  Electronically Signed by           Michael De La Rosa PA-C  08/07/22 2009

## 2022-08-11 ENCOUNTER — HOSPITAL ENCOUNTER (EMERGENCY)
Facility: HOSPITAL | Age: 27
End: 2022-08-11
Attending: EMERGENCY MEDICINE
Payer: COMMERCIAL

## 2022-08-11 ENCOUNTER — HOSPITAL ENCOUNTER (INPATIENT)
Facility: HOSPITAL | Age: 27
LOS: 1 days | Discharge: LEFT BEFORE DISCHARGE | DRG: 894 | End: 2022-08-12
Attending: EMERGENCY MEDICINE | Admitting: EMERGENCY MEDICINE

## 2022-08-11 VITALS
HEIGHT: 67 IN | HEART RATE: 55 BPM | RESPIRATION RATE: 16 BRPM | DIASTOLIC BLOOD PRESSURE: 86 MMHG | TEMPERATURE: 99 F | BODY MASS INDEX: 20.4 KG/M2 | SYSTOLIC BLOOD PRESSURE: 128 MMHG | OXYGEN SATURATION: 99 % | WEIGHT: 130 LBS

## 2022-08-11 DIAGNOSIS — F10.20 ALCOHOL USE DISORDER, SEVERE, DEPENDENCE (HCC): Primary | ICD-10-CM

## 2022-08-11 DIAGNOSIS — F11.20 OPIOID USE DISORDER, SEVERE, DEPENDENCE (HCC): ICD-10-CM

## 2022-08-11 DIAGNOSIS — F11.93 OPIOID WITHDRAWAL (HCC): Primary | ICD-10-CM

## 2022-08-11 DIAGNOSIS — E87.6 HYPOKALEMIA: ICD-10-CM

## 2022-08-11 DIAGNOSIS — F10.939 ALCOHOL WITHDRAWAL (HCC): ICD-10-CM

## 2022-08-11 PROBLEM — F11.23 OPIOID WITHDRAWAL (HCC): Status: ACTIVE | Noted: 2022-08-11

## 2022-08-11 PROBLEM — Z59.00 HOMELESSNESS: Status: ACTIVE | Noted: 2022-08-11

## 2022-08-11 LAB
ALBUMIN SERPL BCP-MCNC: 4.5 G/DL (ref 3.5–5)
ALP SERPL-CCNC: 98 U/L (ref 46–116)
ALT SERPL W P-5'-P-CCNC: 34 U/L (ref 12–78)
ANION GAP SERPL CALCULATED.3IONS-SCNC: 11 MMOL/L (ref 4–13)
APAP SERPL-MCNC: <2 UG/ML (ref 10–20)
AST SERPL W P-5'-P-CCNC: 38 U/L (ref 5–45)
BASOPHILS # BLD AUTO: 0.03 THOUSANDS/ΜL (ref 0–0.1)
BASOPHILS NFR BLD AUTO: 0 % (ref 0–1)
BILIRUB SERPL-MCNC: 0.6 MG/DL (ref 0.2–1)
BUN SERPL-MCNC: 11 MG/DL (ref 5–25)
CALCIUM SERPL-MCNC: 9.4 MG/DL (ref 8.3–10.1)
CHLORIDE SERPL-SCNC: 103 MMOL/L (ref 96–108)
CO2 SERPL-SCNC: 27 MMOL/L (ref 21–32)
CREAT SERPL-MCNC: 0.77 MG/DL (ref 0.6–1.3)
EOSINOPHIL # BLD AUTO: 0.01 THOUSAND/ΜL (ref 0–0.61)
EOSINOPHIL NFR BLD AUTO: 0 % (ref 0–6)
ERYTHROCYTE [DISTWIDTH] IN BLOOD BY AUTOMATED COUNT: 12.1 % (ref 11.6–15.1)
ETHANOL SERPL-MCNC: <3 MG/DL (ref 0–3)
GFR SERPL CREATININE-BSD FRML MDRD: 125 ML/MIN/1.73SQ M
GLUCOSE SERPL-MCNC: 109 MG/DL (ref 65–140)
HCT VFR BLD AUTO: 41.8 % (ref 36.5–49.3)
HGB BLD-MCNC: 14.7 G/DL (ref 12–17)
IMM GRANULOCYTES # BLD AUTO: 0.02 THOUSAND/UL (ref 0–0.2)
IMM GRANULOCYTES NFR BLD AUTO: 0 % (ref 0–2)
LYMPHOCYTES # BLD AUTO: 1.24 THOUSANDS/ΜL (ref 0.6–4.47)
LYMPHOCYTES NFR BLD AUTO: 12 % (ref 14–44)
MAGNESIUM SERPL-MCNC: 2.3 MG/DL (ref 1.6–2.6)
MCH RBC QN AUTO: 30 PG (ref 26.8–34.3)
MCHC RBC AUTO-ENTMCNC: 35.2 G/DL (ref 31.4–37.4)
MCV RBC AUTO: 85 FL (ref 82–98)
MONOCYTES # BLD AUTO: 0.77 THOUSAND/ΜL (ref 0.17–1.22)
MONOCYTES NFR BLD AUTO: 8 % (ref 4–12)
NEUTROPHILS # BLD AUTO: 7.91 THOUSANDS/ΜL (ref 1.85–7.62)
NEUTS SEG NFR BLD AUTO: 80 % (ref 43–75)
NRBC BLD AUTO-RTO: 0 /100 WBCS
PLATELET # BLD AUTO: 334 THOUSANDS/UL (ref 149–390)
PMV BLD AUTO: 9.2 FL (ref 8.9–12.7)
POTASSIUM SERPL-SCNC: 4.3 MMOL/L (ref 3.5–5.3)
PROT SERPL-MCNC: 8.3 G/DL (ref 6.4–8.4)
RBC # BLD AUTO: 4.9 MILLION/UL (ref 3.88–5.62)
SALICYLATES SERPL-MCNC: 4.4 MG/DL (ref 3–20)
SODIUM SERPL-SCNC: 141 MMOL/L (ref 135–147)
WBC # BLD AUTO: 9.98 THOUSAND/UL (ref 4.31–10.16)

## 2022-08-11 PROCEDURE — 99222 1ST HOSP IP/OBS MODERATE 55: CPT

## 2022-08-11 PROCEDURE — 80179 DRUG ASSAY SALICYLATE: CPT | Performed by: EMERGENCY MEDICINE

## 2022-08-11 PROCEDURE — 80143 DRUG ASSAY ACETAMINOPHEN: CPT | Performed by: EMERGENCY MEDICINE

## 2022-08-11 PROCEDURE — HZ2ZZZZ DETOXIFICATION SERVICES FOR SUBSTANCE ABUSE TREATMENT: ICD-10-PCS | Performed by: EMERGENCY MEDICINE

## 2022-08-11 PROCEDURE — 85025 COMPLETE CBC W/AUTO DIFF WBC: CPT | Performed by: EMERGENCY MEDICINE

## 2022-08-11 PROCEDURE — 36415 COLL VENOUS BLD VENIPUNCTURE: CPT | Performed by: EMERGENCY MEDICINE

## 2022-08-11 PROCEDURE — 82077 ASSAY SPEC XCP UR&BREATH IA: CPT | Performed by: EMERGENCY MEDICINE

## 2022-08-11 PROCEDURE — 83735 ASSAY OF MAGNESIUM: CPT

## 2022-08-11 PROCEDURE — 96365 THER/PROPH/DIAG IV INF INIT: CPT

## 2022-08-11 PROCEDURE — 93005 ELECTROCARDIOGRAM TRACING: CPT

## 2022-08-11 PROCEDURE — 99284 EMERGENCY DEPT VISIT MOD MDM: CPT

## 2022-08-11 PROCEDURE — 80053 COMPREHEN METABOLIC PANEL: CPT | Performed by: EMERGENCY MEDICINE

## 2022-08-11 PROCEDURE — 96375 TX/PRO/DX INJ NEW DRUG ADDON: CPT

## 2022-08-11 PROCEDURE — 99285 EMERGENCY DEPT VISIT HI MDM: CPT | Performed by: EMERGENCY MEDICINE

## 2022-08-11 PROCEDURE — 96366 THER/PROPH/DIAG IV INF ADDON: CPT

## 2022-08-11 RX ORDER — ONDANSETRON 2 MG/ML
4 INJECTION INTRAMUSCULAR; INTRAVENOUS EVERY 6 HOURS PRN
Status: DISCONTINUED | OUTPATIENT
Start: 2022-08-11 | End: 2022-08-12 | Stop reason: HOSPADM

## 2022-08-11 RX ORDER — ENOXAPARIN SODIUM 100 MG/ML
40 INJECTION SUBCUTANEOUS DAILY
Status: DISCONTINUED | OUTPATIENT
Start: 2022-08-12 | End: 2022-08-12 | Stop reason: HOSPADM

## 2022-08-11 RX ORDER — LANOLIN ALCOHOL/MO/W.PET/CERES
100 CREAM (GRAM) TOPICAL DAILY
Status: DISCONTINUED | OUTPATIENT
Start: 2022-08-12 | End: 2022-08-12 | Stop reason: HOSPADM

## 2022-08-11 RX ORDER — CLONIDINE HYDROCHLORIDE 0.1 MG/1
0.1 TABLET ORAL EVERY 6 HOURS PRN
Status: DISCONTINUED | OUTPATIENT
Start: 2022-08-11 | End: 2022-08-12 | Stop reason: HOSPADM

## 2022-08-11 RX ORDER — ACETAMINOPHEN 325 MG/1
650 TABLET ORAL EVERY 6 HOURS PRN
Status: DISCONTINUED | OUTPATIENT
Start: 2022-08-11 | End: 2022-08-12 | Stop reason: HOSPADM

## 2022-08-11 RX ORDER — TRAZODONE HYDROCHLORIDE 50 MG/1
50 TABLET ORAL
Status: DISCONTINUED | OUTPATIENT
Start: 2022-08-11 | End: 2022-08-12 | Stop reason: HOSPADM

## 2022-08-11 RX ORDER — GABAPENTIN 300 MG/1
300 CAPSULE ORAL EVERY 8 HOURS PRN
Status: DISCONTINUED | OUTPATIENT
Start: 2022-08-11 | End: 2022-08-12 | Stop reason: HOSPADM

## 2022-08-11 RX ORDER — NICOTINE 21 MG/24HR
1 PATCH, TRANSDERMAL 24 HOURS TRANSDERMAL DAILY
Status: DISCONTINUED | OUTPATIENT
Start: 2022-08-12 | End: 2022-08-12 | Stop reason: HOSPADM

## 2022-08-11 RX ORDER — FOLIC ACID 1 MG/1
1 TABLET ORAL DAILY
Status: DISCONTINUED | OUTPATIENT
Start: 2022-08-12 | End: 2022-08-12 | Stop reason: HOSPADM

## 2022-08-11 RX ORDER — LORAZEPAM 2 MG/ML
1 INJECTION INTRAMUSCULAR ONCE
Status: COMPLETED | OUTPATIENT
Start: 2022-08-11 | End: 2022-08-11

## 2022-08-11 RX ORDER — SODIUM CHLORIDE 9 MG/ML
100 INJECTION, SOLUTION INTRAVENOUS CONTINUOUS
Status: DISCONTINUED | OUTPATIENT
Start: 2022-08-11 | End: 2022-08-12 | Stop reason: HOSPADM

## 2022-08-11 RX ORDER — BUPRENORPHINE 20 UG/H
20 PATCH TRANSDERMAL
Status: DISCONTINUED | OUTPATIENT
Start: 2022-08-11 | End: 2022-08-12 | Stop reason: HOSPADM

## 2022-08-11 RX ADMIN — FOLIC ACID: 5 INJECTION, SOLUTION INTRAMUSCULAR; INTRAVENOUS; SUBCUTANEOUS at 19:12

## 2022-08-11 RX ADMIN — LORAZEPAM 1 MG: 2 INJECTION INTRAMUSCULAR; INTRAVENOUS at 19:11

## 2022-08-11 RX ADMIN — SODIUM CHLORIDE 1000 ML: 0.9 INJECTION, SOLUTION INTRAVENOUS at 18:52

## 2022-08-11 RX ADMIN — SODIUM CHLORIDE 100 ML/HR: 0.9 INJECTION, SOLUTION INTRAVENOUS at 22:17

## 2022-08-11 RX ADMIN — BUPRENORPHINE 20 MCG: 20 PATCH, EXTENDED RELEASE TRANSDERMAL at 22:17

## 2022-08-11 NOTE — ED PROVIDER NOTES
History  Chief Complaint   Patient presents with    Detox Evaluation     Pt reports to ED for detox from fentanyl and alcohol  Pt decided about an hour ago that he wants help  Pt reports smoking about 7 bags per day and drinking about a pint of whiskey per day  Pt reports using substances for the past year and has been in rehab 2 times  Patient is a 49-year-old male with past medical history significant for alcohol abuse with prior withdrawal seizures, opioid abuse who today presents requesting detox  Patient reports that he last used 7 bags of fentanyl 1 5 days ago (typically uses 5-7 bags daily by smoking it) and last used alcohol 1 day ago (typically drinks a pt of whiskey daily)  States that usually, he starts to have symptoms of alcohol withdrawal less than 24 hours after he stops drinking  He describes feeling nauseous, shaky, anxious, jittery, generally unwell  He states this feels like prior episodes of alcohol withdrawal   Denies any seizures in the last week  Prior to Admission Medications   Prescriptions Last Dose Informant Patient Reported? Taking?   chlordiazePOXIDE (LIBRIUM) 25 mg capsule   No No   Simg PO tid x 3d, then 25mg PO tid x 3d, then 25mg PO q8hr prn withdrawal      Facility-Administered Medications: None       Past Medical History:   Diagnosis Date    Alcohol abuse        History reviewed  No pertinent surgical history  Family History   Problem Relation Age of Onset    Hypertension Mother     Hypertension Father      I have reviewed and agree with the history as documented  E-Cigarette/Vaping    E-Cigarette Use Never User      E-Cigarette/Vaping Substances    Nicotine No     Flavoring No      Social History     Tobacco Use    Smoking status: Current Some Day Smoker     Packs/day: 1 00    Smokeless tobacco: Never Used   Vaping Use    Vaping Use: Never used   Substance Use Topics    Alcohol use: Yes     Comment: patient reports pint of whiskey daily      Drug use: Yes     Types: Fentanyl       Review of Systems   Constitutional: Negative for chills and fever  HENT: Negative for congestion and rhinorrhea  Eyes: Negative for photophobia and visual disturbance  Respiratory: Negative for cough and shortness of breath  Cardiovascular: Negative for chest pain and palpitations  Gastrointestinal: Positive for nausea  Negative for abdominal pain, constipation, diarrhea and vomiting  Genitourinary: Negative for dysuria, flank pain and hematuria  Musculoskeletal: Negative for back pain, neck pain and neck stiffness  Skin: Negative for color change and pallor  Neurological: Negative for dizziness, weakness, light-headedness, numbness and headaches  Psychiatric/Behavioral: The patient is nervous/anxious  Physical Exam  Physical Exam  Vitals and nursing note reviewed  Constitutional:       General: He is not in acute distress  Appearance: Normal appearance  He is not ill-appearing, toxic-appearing or diaphoretic  HENT:      Head: Normocephalic and atraumatic  Mouth/Throat:      Mouth: Mucous membranes are moist    Eyes:      Conjunctiva/sclera: Conjunctivae normal       Pupils: Pupils are equal, round, and reactive to light  Cardiovascular:      Rate and Rhythm: Normal rate and regular rhythm  Pulses: Normal pulses  Heart sounds: Normal heart sounds  No murmur heard  Pulmonary:      Effort: Pulmonary effort is normal  No respiratory distress  Breath sounds: Normal breath sounds  No stridor  No wheezing, rhonchi or rales  Chest:      Chest wall: No tenderness  Abdominal:      General: Bowel sounds are normal  There is no distension  Palpations: Abdomen is soft  Tenderness: There is no abdominal tenderness  There is no guarding or rebound  Musculoskeletal:      Cervical back: Normal range of motion and neck supple  Right lower leg: No edema  Left lower leg: No edema     Skin:     General: Skin is warm and dry  Neurological:      General: No focal deficit present  Mental Status: He is alert and oriented to person, place, and time  Mental status is at baseline  Comments: tremulous   Psychiatric:         Mood and Affect: Mood is anxious  Speech: Speech normal          Behavior: Behavior is cooperative  Thought Content: Thought content does not include homicidal or suicidal ideation  Thought content does not include homicidal or suicidal plan  Vital Signs  ED Triage Vitals [08/11/22 1756]   Temperature Pulse Respirations Blood Pressure SpO2   99 °F (37 2 °C) 95 16 141/88 99 %      Temp Source Heart Rate Source Patient Position - Orthostatic VS BP Location FiO2 (%)   Oral Monitor Lying Right arm --      Pain Score       3           Vitals:    08/11/22 2032 08/11/22 2055 08/11/22 2100 08/11/22 2102   BP: 151/75  128/86 128/86   Pulse: 55 70  55   Patient Position - Orthostatic VS:             Visual Acuity      ED Medications  Medications   sodium chloride 0 9 % bolus 1,000 mL (0 mL Intravenous Stopped 9/77/61 9374)   folic acid 1 mg, thiamine (VITAMIN B1) 100 mg in sodium chloride 0 9 % 100 mL IV piggyback ( Intravenous Stopped 8/11/22 2110)   LORazepam (ATIVAN) injection 1 mg (1 mg Intravenous Given 8/11/22 1911)       Diagnostic Studies  Results Reviewed     Procedure Component Value Units Date/Time    Salicylate level [[id number]]  (Normal) Collected: 08/11/22 1851    Lab Status: Final result Specimen: Blood from Arm, Right Updated: 49/82/31 8550     Salicylate Lvl 4 4 mg/dL     Acetaminophen level-If concentration is detectable, please discuss with medical  on call   [[id number]]  (Abnormal) Collected: 08/11/22 1851    Lab Status: Final result Specimen: Blood from Arm, Right Updated: 08/11/22 1956     Acetaminophen Level <2 0 ug/mL     Comprehensive metabolic panel [[id number]] Collected: 08/11/22 1851    Lab Status: Final result Specimen: Blood from Arm, Right Updated: 08/11/22 1956     Sodium 141 mmol/L      Potassium 4 3 mmol/L      Chloride 103 mmol/L      CO2 27 mmol/L      ANION GAP 11 mmol/L      BUN 11 mg/dL      Creatinine 0 77 mg/dL      Glucose 109 mg/dL      Calcium 9 4 mg/dL      AST 38 U/L      ALT 34 U/L      Alkaline Phosphatase 98 U/L      Total Protein 8 3 g/dL      Albumin 4 5 g/dL      Total Bilirubin 0 60 mg/dL      eGFR 125 ml/min/1 73sq m     Narrative:      National Kidney Disease Foundation guidelines for Chronic Kidney Disease (CKD):     Stage 1 with normal or high GFR (GFR > 90 mL/min/1 73 square meters)    Stage 2 Mild CKD (GFR = 60-89 mL/min/1 73 square meters)    Stage 3A Moderate CKD (GFR = 45-59 mL/min/1 73 square meters)    Stage 3B Moderate CKD (GFR = 30-44 mL/min/1 73 square meters)    Stage 4 Severe CKD (GFR = 15-29 mL/min/1 73 square meters)    Stage 5 End Stage CKD (GFR <15 mL/min/1 73 square meters)  Note: GFR calculation is accurate only with a steady state creatinine    Ethanol [[id number]]  (Normal) Collected: 08/11/22 1851    Lab Status: Final result Specimen: Blood from Arm, Right Updated: 08/11/22 1942     Ethanol Lvl <3 mg/dL     CBC and differential [[id number]]  (Abnormal) Collected: 08/11/22 1851    Lab Status: Final result Specimen: Blood from Arm, Right Updated: 08/11/22 1906     WBC 9 98 Thousand/uL      RBC 4 90 Million/uL      Hemoglobin 14 7 g/dL      Hematocrit 41 8 %      MCV 85 fL      MCH 30 0 pg      MCHC 35 2 g/dL      RDW 12 1 %      MPV 9 2 fL      Platelets 443 Thousands/uL      nRBC 0 /100 WBCs      Neutrophils Relative 80 %      Immat GRANS % 0 %      Lymphocytes Relative 12 %      Monocytes Relative 8 %      Eosinophils Relative 0 %      Basophils Relative 0 %      Neutrophils Absolute 7 91 Thousands/µL      Immature Grans Absolute 0 02 Thousand/uL      Lymphocytes Absolute 1 24 Thousands/µL      Monocytes Absolute 0 77 Thousand/µL      Eosinophils Absolute 0 01 Thousand/µL      Basophils Absolute 0 03 Thousands/µL     Rapid drug screen, urine [[id number]]     Lab Status: No result Specimen: Urine     UA w Reflex to Microscopic w Reflex to Culture [[id number]]     Lab Status: No result Specimen: Urine                  No orders to display              Procedures  ECG 12 Lead Documentation Only    Date/Time: 8/11/2022 7:05 PM  Performed by: Maria A Hunter DO  Authorized by: Maria A Hunter DO     Indications / Diagnosis:  Etoh withdrawal  ECG reviewed by me, the ED Provider: yes    Patient location:  ED  Previous ECG:     Previous ECG:  Compared to current    Comparison ECG info:  1/29/2020    Similarity:  No change  Interpretation:     Interpretation: normal    Rate:     ECG rate:  65    ECG rate assessment: normal    Rhythm:     Rhythm: sinus rhythm    Ectopy:     Ectopy: none    QRS:     QRS axis:  Normal    QRS intervals:  Normal  Conduction:     Conduction: normal    ST segments:     ST segments:  Normal  T waves:     T waves: normal    Comments:      qtc 430             ED Course                               SBIRT 20yo+    Flowsheet Row Most Recent Value   SBIRT (23 yo +)    In order to provide better care to our patients, we are screening all of our patients for alcohol and drug use  Would it be okay to ask you these screening questions? Yes Filed at: 08/11/2022 1824   Initial Alcohol Screen: US AUDIT-C     1  How often do you have a drink containing alcohol? 6 Filed at: 08/11/2022 1824   2  How many drinks containing alcohol do you have on a typical day you are drinking? 6 Filed at: 08/11/2022 1824   3a  Male UNDER 65: How often do you have five or more drinks on one occasion? 0 Filed at: 08/11/2022 1824   3b  FEMALE Any Age, or MALE 65+: How often do you have 4 or more drinks on one occassion? 0 Filed at: 08/11/2022 1824   Audit-C Score 12 Filed at: 08/11/2022 1824   Full Alcohol Screen: US AUDIT    4   How often during the last year have you found that you were not able to stop drinking once you had started? 4 Filed at: 08/11/2022 1824   5  How often during past year have you failed to do what was normally expected of you because of drinking? 4 Filed at: 08/11/2022 1824   6  How often in past year have you needed a first drink in the morning to get yourself going after a heavy drinking session? 4 Filed at: 08/11/2022 1824   7  How often in past year have you had feeling of guilt or remorse after drinking? 4 Filed at: 08/11/2022 1824   8  How often in past year have you been unable to remember what happened night before because you had been drinking? 4 Filed at: 08/11/2022 1824   9  Have you or someone else been injured as a result of your drinking? 0 Filed at: 08/11/2022 1824   10  Has a relative, friend, doctor or other health worker been concerned about your drinking and suggested you cut down?  0 Filed at: 08/11/2022 1824   AUDIT Total Score 32 Filed at: 08/11/2022 1824   KIRK: How many times in the past year have you    Used an illegal drug or used a prescription medication for non-medical reasons? Daily or Almost Daily Filed at: 08/11/2022 1824   DAST-10: In the past 12 months       1  Have you used drugs other than those required for medical reasons? 1 Filed at: 08/11/2022 1824   2  Do you use more than one drug at a time? 1 Filed at: 08/11/2022 1824   3  Have you had medical problems as a result of your drug use (e g , memory loss, hepatitis, convulsions, bleeding, etc )? 0 Filed at: 08/11/2022 1824   4  Have you had "blackouts" or "flashbacks" as a result of drug use? YesNo 1 Filed at: 08/11/2022 1824   5  Do you ever feel bad or guilty about your drug use? 1 Filed at: 08/11/2022 1824   6  Does your spouse (or parent) ever complain about your involvement with drugs? 1 Filed at: 08/11/2022 1824   7  Have you neglected your family because of your use of drugs? 1 Filed at: 08/11/2022 1824   8  Have you engaged in illegal activities in order to obtain drugs? 0 Filed at: 08/11/2022 1822   9   Have you ever experienced withdrawal symptoms (felt sick) when you stopped taking drugs? 1 Filed at: 08/11/2022 1824   10  Are you always able to stop using drugs when you want to? 1 Filed at: 08/11/2022 1824   DAST-10 Score 8 Filed at: 08/11/2022 1824                    MDM  Number of Diagnoses or Management Options  Alcohol withdrawal ([facility] [address] )  Opioid withdrawal ([facility] [address] )  Diagnosis management comments: Assessment and plan:  61-year-old male presenting with acute alcohol an opioid withdrawal   Will check EKG to evaluate for arrhythmia/QRS/QTC abnormalities, labs to evaluate for electrolyte abnormalities  Treat symptomatically with Ativan for alcohol withdrawal as see what is 15 at the time of my evaluation  Discuss with [address] [place] [address] for transfer for admission to detox program       Disposition  Final diagnoses:   Opioid withdrawal ([address] )   Alcohol withdrawal ([facility] [address] )     Time reflects when diagnosis was documented in both MDM as applicable and the Disposition within this note     Time User Action Codes Description Comment    8/11/2022  7:07 PM Marcie Sandoval Add [U85 52] Opioid withdrawal ([facility] [address] )     8/11/2022  7:07 PM Marcie Sandoval Add [F10 239] Alcohol withdrawal [facility])       ED Disposition     ED Disposition   Transfer to Another Facility-In Network    Condition   --    Date/Time   Thu Aug 11, 2022  9:18 PM    Comment   Charmayne Andreas should be transferred out to [facility] detox             MD Documentation    [place] [facility] Most Recent Value   Patient Condition The patient has been stabilized such that within reasonable medical probability, no material deterioration of the patient condition or the condition of the unborn child(suyapa) is likely to result from the transfer   Reason for Transfer Level of Care needed not available at this facility   Benefits of Transfer Specialized equipment and/or services available at the receiving facility (Include comment)________________________   Risks of Transfer Potential for delay in receiving treatment, Potential deterioration of medical condition, Loss of IV, Increased discomfort during transfer, Possible worsening of condition or death during transfer   Accepting Physician Dr Chema Sabillon [facility], [facility] detox   Sending MD Dr Jeremy Sifuentes   Provider Certification General risk, such as traffic hazards, adverse weather conditions, rough terrain or turbulence, possible failure of equipment (including vehicle or aircraft), or consequences of actions of persons outside the control of the transport personnel, Unanticipated needs of medical equipment and personnel during transport, Risk of worsening condition, The possibility of a transport vehicle being unavailable      RN Documentation    [address]  [facility] detox      Follow-up Information    None         Discharge Medication List as of 8/11/2022  9:18 PM      CONTINUE these medications which have NOT CHANGED    Details   chlordiazePOXIDE (LIBRIUM) 25 mg capsule 50mg PO tid x 3d, then 25mg PO tid x 3d, then 25mg PO q8hr prn withdrawal, Normal             No discharge procedures on file      PDMP Review       Value Time User    PDMP Reviewed  Yes 1/29/2020 12:50 PM Christine Romero PA-C          ED Provider  Electronically Signed by           Ester Sibley DO  08/11/22 2030       Ester Sibley DO  08/11/22 2118

## 2022-08-11 NOTE — Clinical Note
Donato Mayo should be transferred out to [facility] detox  [facility]  Pediatric Critical Care  Progress Note    Patient Name: Rosa Phillips  MRN: [id number]  Admission Date: 1/24/2019  Hospital Length of Stay: 115 days  Code Status: Full Code   Attending Provider: Kimber Marrero DO   Primary Care Physician: Raina Castillo MD    Subjective:     HPI: 7 Tiffany TUBBS-  Pediatric Critical Care  [facility]

## 2022-08-12 VITALS
DIASTOLIC BLOOD PRESSURE: 71 MMHG | TEMPERATURE: 99.6 F | HEIGHT: 67 IN | WEIGHT: 126 LBS | HEART RATE: 85 BPM | BODY MASS INDEX: 19.78 KG/M2 | SYSTOLIC BLOOD PRESSURE: 120 MMHG | OXYGEN SATURATION: 96 % | RESPIRATION RATE: 18 BRPM

## 2022-08-12 LAB
ALBUMIN SERPL BCP-MCNC: 4 G/DL (ref 3.5–5)
ALP SERPL-CCNC: 66 U/L (ref 43–122)
ALT SERPL W P-5'-P-CCNC: 24 U/L
ANION GAP SERPL CALCULATED.3IONS-SCNC: 6 MMOL/L (ref 5–14)
AST SERPL W P-5'-P-CCNC: 26 U/L (ref 17–59)
ATRIAL RATE: 65 BPM
BILIRUB SERPL-MCNC: 0.56 MG/DL (ref 0.2–1)
BUN SERPL-MCNC: 7 MG/DL (ref 5–25)
CALCIUM SERPL-MCNC: 8.4 MG/DL (ref 8.4–10.2)
CHLORIDE SERPL-SCNC: 111 MMOL/L (ref 96–108)
CO2 SERPL-SCNC: 22 MMOL/L (ref 21–32)
CREAT SERPL-MCNC: 0.66 MG/DL (ref 0.7–1.5)
ERYTHROCYTE [DISTWIDTH] IN BLOOD BY AUTOMATED COUNT: 12.2 % (ref 11.6–15.1)
GFR SERPL CREATININE-BSD FRML MDRD: 133 ML/MIN/1.73SQ M
GLUCOSE SERPL-MCNC: 100 MG/DL (ref 70–99)
HAV IGM SER QL: NORMAL
HBV CORE IGM SER QL: NORMAL
HBV SURFACE AG SER QL: NORMAL
HCT VFR BLD AUTO: 37.7 % (ref 36.5–49.3)
HCV AB SER QL: NORMAL
HGB BLD-MCNC: 12.9 G/DL (ref 12–17)
HIV 1+2 AB+HIV1 P24 AG SERPL QL IA: NORMAL
HIV1 P24 AG SER QL: NORMAL
MAGNESIUM SERPL-MCNC: 1.8 MG/DL (ref 1.6–2.3)
MCH RBC QN AUTO: 29.2 PG (ref 26.8–34.3)
MCHC RBC AUTO-ENTMCNC: 34.2 G/DL (ref 31.4–37.4)
MCV RBC AUTO: 85 FL (ref 82–98)
P AXIS: 61 DEGREES
PLATELET # BLD AUTO: 274 THOUSANDS/UL (ref 149–390)
PMV BLD AUTO: 9 FL (ref 8.9–12.7)
POTASSIUM SERPL-SCNC: 3.1 MMOL/L (ref 3.5–5.3)
PR INTERVAL: 116 MS
PROT SERPL-MCNC: 6.9 G/DL (ref 6.4–8.4)
QRS AXIS: 92 DEGREES
QRSD INTERVAL: 94 MS
QT INTERVAL: 414 MS
QTC INTERVAL: 430 MS
RBC # BLD AUTO: 4.42 MILLION/UL (ref 3.88–5.62)
SODIUM SERPL-SCNC: 139 MMOL/L (ref 135–147)
T WAVE AXIS: 71 DEGREES
VENTRICULAR RATE: 65 BPM
WBC # BLD AUTO: 7.36 THOUSAND/UL (ref 4.31–10.16)

## 2022-08-12 PROCEDURE — 93010 ELECTROCARDIOGRAM REPORT: CPT | Performed by: INTERNAL MEDICINE

## 2022-08-12 PROCEDURE — 99239 HOSP IP/OBS DSCHRG MGMT >30: CPT | Performed by: PHYSICIAN ASSISTANT

## 2022-08-12 PROCEDURE — 87806 HIV AG W/HIV1&2 ANTB W/OPTIC: CPT

## 2022-08-12 PROCEDURE — NC001 PR NO CHARGE: Performed by: EMERGENCY MEDICINE

## 2022-08-12 PROCEDURE — 80074 ACUTE HEPATITIS PANEL: CPT

## 2022-08-12 PROCEDURE — 85027 COMPLETE CBC AUTOMATED: CPT

## 2022-08-12 PROCEDURE — 83735 ASSAY OF MAGNESIUM: CPT

## 2022-08-12 PROCEDURE — 80053 COMPREHEN METABOLIC PANEL: CPT

## 2022-08-12 RX ORDER — FOLIC ACID 1 MG/1
1 TABLET ORAL DAILY
Refills: 0
Start: 2022-08-13

## 2022-08-12 RX ORDER — FAMOTIDINE 20 MG/1
20 TABLET, FILM COATED ORAL ONCE
Status: DISCONTINUED | OUTPATIENT
Start: 2022-08-12 | End: 2022-08-12

## 2022-08-12 RX ORDER — POTASSIUM CHLORIDE 14.9 MG/ML
20 INJECTION INTRAVENOUS ONCE
Status: COMPLETED | OUTPATIENT
Start: 2022-08-12 | End: 2022-08-12

## 2022-08-12 RX ORDER — POTASSIUM CHLORIDE 20 MEQ/1
40 TABLET, EXTENDED RELEASE ORAL ONCE
Status: COMPLETED | OUTPATIENT
Start: 2022-08-12 | End: 2022-08-12

## 2022-08-12 RX ORDER — LANOLIN ALCOHOL/MO/W.PET/CERES
100 CREAM (GRAM) TOPICAL DAILY
Refills: 0
Start: 2022-08-13

## 2022-08-12 RX ORDER — PHENOBARBITAL SODIUM 130 MG/ML
260 INJECTION INTRAMUSCULAR ONCE
Status: COMPLETED | OUTPATIENT
Start: 2022-08-12 | End: 2022-08-12

## 2022-08-12 RX ADMIN — FOLIC ACID 1 MG: 1 TABLET ORAL at 08:00

## 2022-08-12 RX ADMIN — MULTIPLE VITAMINS W/ MINERALS TAB 1 TABLET: TAB ORAL at 08:00

## 2022-08-12 RX ADMIN — NALOXONE HYDROCHLORIDE 4 MG: 4 SPRAY NASAL at 15:59

## 2022-08-12 RX ADMIN — POTASSIUM CHLORIDE 40 MEQ: 1500 TABLET, EXTENDED RELEASE ORAL at 07:59

## 2022-08-12 RX ADMIN — SODIUM CHLORIDE 100 ML/HR: 0.9 INJECTION, SOLUTION INTRAVENOUS at 08:08

## 2022-08-12 RX ADMIN — CLONIDINE HYDROCHLORIDE 0.1 MG: 0.1 TABLET ORAL at 09:34

## 2022-08-12 RX ADMIN — GABAPENTIN 300 MG: 300 CAPSULE ORAL at 08:07

## 2022-08-12 RX ADMIN — POTASSIUM CHLORIDE 20 MEQ: 14.9 INJECTION, SOLUTION INTRAVENOUS at 07:55

## 2022-08-12 RX ADMIN — THIAMINE HCL TAB 100 MG 100 MG: 100 TAB at 08:00

## 2022-08-12 RX ADMIN — PHENOBARBITAL SODIUM 260 MG: 130 INJECTION INTRAMUSCULAR at 05:56

## 2022-08-12 NOTE — PROGRESS NOTES
08/12/22 2486   Referral Data   Referral Source Patient   Referral Reason Drug/Alcohol [place]   Readmission Root Cause   30 Day Readmission No   Patient Information   Mental Status Alert   Primary Caregiver Self   Support System Friends;Immediate family   Legal Information   Legal Issues Pt reports legal issues, did not give specifics, denies being on probation or parole   Activities of Daily Living Prior to Admission   Functional Status Independent   Assistive Device No device needed   Living Arrangement Homeless  (Pt is currently homeless for the last month)   Ambulation Independent   Access to Firearms   Access to Firearms No   Income [address] Unemployed   [place] [employer]   [place] [employer] [facility] Transport to [facility]: [employer]       Worker completed assessment  Worker explained role of case management  Worker confirmed name, address and phone number  Pt with irritable edge during assessment  Pt reports he started using fentanyl at age 22  Pt reports he has been using for the last year daily, 5-7 bags via smoking  Pt reports his girlfriend's mother is who got him started on fentanyl  Pt reports he first started drinking alcohol a few years ago, very vague regarding age of first use  Pt reports drinking 1 pint of liquor daily  Pt did not discuss pattern of his alcohol use at this time  Pt was primarily focused on going outside to smoke a cigarette  Worker did educate pt that if he chooses to leave, he cannot come straight back to the unit, he will have to start the detox process over again by going to the ED  Pt reports being in inpatient rehab about 1 1/2 months ago at [facility]  Pt reports he did not complete the program, did not give specific reason why  Pt at this time requesting inpatient rehab, gave verbal permission for worker to contact [facility]    Worker did speak with Garry Chua at [facility] regarding pt, they will attempt to complete the assessment with pt later this afternoon  Worker faxed over clinical information to [facility] at [phone]  Pt is currently single, with no children  Pt is currently homeless for the last month, was previously residing in his own apartment but was evicted  Pt is currently unemployed, unclear when he last worked  Pt completed 12th grade  Pt reports legal issues but did not discuss specifics, denied being on probation or parole  Pt denies any inpatient psychiatric hospitalizations  Pt has no current outpatient psychiatric provider  Pt denies any suicide attempts  Pt's family hx of mental health is unknown  Pt's trauma hx is also unknown  Pt did not report any medical issues, denied have a PCP at this time  Pt has no insurance listed, reports not having insurance at this time  Pt gave worker verbal permission to speak with his mother Karrie Mention, worker attempted to contact pt's mother twice at [phone], both times got busy signal   Worker will continue to try to contact  AUDIT: 12  UDS: none  Alcohol: <3    Pt declined to complete relapse prevention plan  Clinical impression, pt irritable upon assessment  Pt was primarily focused on leaving to go smoke  Worker did educate pt on detox process, pt was receptive to such  Pt uncertain if he wants to start on Suboxone, reports he has been on it while in inpatient rehab, never did maintenance  Pt not forthcoming regarding his use and does appear to lack insight into his substance abuse  Pt requesting inpatient rehab, but motivation for treatment does appear to be lacking  Pt is in the pre-contemplation stage of change

## 2022-08-12 NOTE — ASSESSMENT & PLAN NOTE
· Patient with a history of chronic fentanyl use  · Uses about 7 bags daily via smoking  · Endorses past h/o IVDU  · History of prior Suboxone therapy in IP rehab  · Management of opioid withdrawal under MAT protocol as above  · Screen for hepatitis/HIV with h/o IVDU  · Case management consulted for assistance with rehab resources - pt expresses interest in inpatient rehabilitation after d/c

## 2022-08-12 NOTE — ASSESSMENT & PLAN NOTE
· Patient with a history of chronic daily alcohol use  · Last drink yesterday  · Serum alcohol <3 in the ED  · Received Ativan 1 mg in the ED PTA  · Initiate SEWS protocol for medical management of alcohol withdrawal  · Pt displays no current s/sx of alcohol withdrawal  · Hold on administering phenobarbital at this time as pt in NAD and RAAS -1  · Continue monitoring under protocol and administer phenobarbital as indicated when pt develops s/sx of alcohol withdrawal  · Continuous pulse ox and telemetry monitoring

## 2022-08-12 NOTE — ASSESSMENT & PLAN NOTE
· Pt with a h/o chronic heavy alcohol use  · Drinks 1 pt of liquor daily  · H/o withdrawal seizures  · Prior inpatient treatment hx  · Withdrawal management as above  · Initiate IVFs, daily thiamine/folic acid supplementation, and MVI  · Consult case management for assistance with rehab resources - pt interested in inpatient rehabilitation at this time

## 2022-08-12 NOTE — ASSESSMENT & PLAN NOTE
· Patient with a history of chronic opioid use  · Last reported use was 1 5-2 days ago  · However, pt was initially somnolent with soft BP upon arrival and found with a makeshift apparatus used to smoke fentanyl in his pocket  · Suspect possible use just prior to admission  · suboxone MAT protocol initiated on admission  · 1 Butrans 20 mcg/hr patch applied on admission (removed prior to discharge)  · plan was to initiate micro-induction with suboxone later today or tomorrow  · Currently presents with anxiety/agitation, restlessness  · Despite education of importance of remaining in the hospital for ongoing withdrawal management, patient signed out AMA

## 2022-08-12 NOTE — UTILIZATION REVIEW
Initial Clinical Review    Pt initially presented to [address] [place] [address] ED  Pt was transferred by EMS to [address] [place] [address] for its Level IV medically managed intensive inpatient detox unit, not available at [address] [place] [address]  Admission: Date/Time/Statement:   Admission Orders (From admission, onward)     Ordered        08/11/22 2154  Inpatient Admission  Once                      Orders Placed This Encounter   Procedures    Inpatient Admission     Standing Status:   Standing     Number of Occurrences:   1     Order Specific Question:   Level of Care     Answer:   Med Surg [16]     Order Specific Question:   Estimated length of stay     Answer:   More than 2 Midnights     Order Specific Question:   Certification     Answer:   I certify that inpatient services are medically necessary for this patient for a duration of greater than two midnights  See H&P and MD Progress Notes for additional information about the patient's course of treatment  Initial Presentation: 32 y o  male who initially presented to [address] [place] [address], was then transferred by EMS to [address] [place] [address] as a direct admission to medical detox  Inpatient admission for evaluation and treatment of alcohol withdrawal & opioid withdrawal syndrome  PMHx: AUD  Presented w/ request for detox from alcohol & opioids  Reports 1 pint whiskey daily, last drink on 8/10  Reports 5-7 bags fentanyl use via smoking; last use 8/10  Has prior inpatient treatment for withdrawal  Notes prior use of Suboxone  Reports hx of withdrawal seizures  On exam, somnolent but arouses easily, tongue fasciculations, tremors, blunt and withdrawn  SEWS 0  Plan: SEWS monitoring w/ phenobarbital management, thiamine/folic acid supplement, IVF, telemetry, continuous pulse ox, continue home meds, trend labs, replete electrolytes as needed; Butrans patch  Date: 08/12/22       Day 2: On exam, diaphoretic, tremors, anxious   SEWS 7  Plan: continue SEWS monitoring w/ phenobarbital management, thiamine/folic acid supplement, telemetry, continuous pulse ox, continue home meds, trend labs, replete electrolytes as needed, continue Butrans patch, symptomatic supportive care  Wt Readings from Last 1 Encounters:   08/11/22 57 2 kg (126 lb)     Vital Signs:   Date/Time Temp Pulse Resp BP MAP (mmHg) SpO2 O2 Device   08/12/22 1104 99 1 °F (37 3 °C) 75 18 135/77 96 98 % None (Room air)   08/12/22 0900 -- 72 -- 137/70 -- -- --   08/12/22 0710 99 °F (37 2 °C) 78 18 124/73 90 99 % None (Room air)   08/12/22 0547 98 5 °F (36 9 °C) 59 18 151/69 96 99 % None (Room air)   08/11/22 2158 98 8 °F (37 1 °C) 66 18 98/76 -- 97 % None (Room air)      08/12/22 0905 08/12/22 0800 08/12/22 0627 08/12/22 0548 08/12/22 0232 08/11/22 2220   Severity of Ethanol Withdrawal Scale (SEWS)    ANXIETY: Do you feel that something bad is about to happen to you right now? 0  -TD 3  -LL 0  -DE 3  -DE 0  -DE 0   NAUSEA and DRY HEAVES or VOMITING? 0  -TD 0  -LL 0  -DE 0  -DE 0  -DE 0   SWEATING: (includes moist palms, sweating now)? Score 0 or 2 0  -TD 0  -LL 0  -DE 2  -DE 0  -DE 0   TREMOR: with arms extended eyes closed? 2  -TD 0  -LL 0  -DE 2  -DE 0  -DE 0   AGITATION: Fidgety, restless, pacing?  0  -TD 0  -LL 0  -DE 0  -DE 0  -DE 0   DISORIENTATION: 0  -TD 0  -LL 0  -DE 0  -DE 0  -DE 0   HALLUCINATIONS: 0  -TD 0  -LL 0  -DE 0  -DE 0  -DE 0   VITAL SIGNS: ANY (Pulse >239, Diastolic BP >80, Temp >51 1) 0  -TD 0  -LL 0  -DE 0  -DE 0  -DE 0   SEWS Total Score 2  -TD 3  -LL 0  -DE 7  -DE 0  -DE 0         Pertinent Labs/Diagnostic Test Results:   Results from last 7 days   Lab Units 08/07/22  1657   SARS-COV-2  Negative     Results from last 7 days   Lab Units 08/12/22  0457 08/11/22  1851   WBC Thousand/uL 7 36 9 98   HEMOGLOBIN g/dL 12 9 14 7   HEMATOCRIT % 37 7 41 8   PLATELETS Thousands/uL 274 334   NEUTROS ABS Thousands/µL  --  7 91*         Results from last 7 days   Lab Units 08/12/22  0457 08/11/22  1851   SODIUM mmol/L 139 141   POTASSIUM mmol/L 3 1* 4 3   CHLORIDE mmol/L 111* 103   CO2 mmol/L 22 27   ANION GAP mmol/L 6 11   BUN mg/dL 7 11   CREATININE mg/dL 0 66* 0 77   EGFR ml/min/1 73sq m 133 125   CALCIUM mg/dL 8 4 9 4   MAGNESIUM mg/dL 1 8 2 3     Results from last 7 days   Lab Units 08/12/22  0457 08/11/22  1851   AST U/L 26 38   ALT U/L 24 34   ALK PHOS U/L 66 98   TOTAL PROTEIN g/dL 6 9 8 3   ALBUMIN g/dL 4 0 4 5   TOTAL BILIRUBIN mg/dL 0 56 0 60         Results from last 7 days   Lab Units 08/12/22  0457 08/11/22  1851   GLUCOSE RANDOM mg/dL 100* 109     Results from last 7 days   Lab Units 08/07/22  1657   INFLUENZA A PCR  Negative   INFLUENZA B PCR  Negative   RSV PCR  Negative         Results from last 7 days   Lab Units 08/07/22  1713   AMPH/METH  Negative   BARBITURATE UR  Negative   BENZODIAZEPINE UR  Negative   COCAINE UR  Negative   METHADONE URINE  Negative   OPIATE UR  Negative   PCP UR  Negative   THC UR  Negative     Results from last 7 days   Lab Units 08/11/22  1851   ETHANOL LVL mg/dL <3   ACETAMINOPHEN LVL ug/mL <2 1*   SALICYLATE LVL mg/dL 4 4       Past Medical History:   Diagnosis Date    Alcohol abuse      Present on Admission:   Alcohol withdrawal syndrome with complication (HCC)   Alcohol use disorder, severe, dependence ([place] [address] )   Opioid use disorder, severe, dependence ([facility])   Opioid withdrawal ([place] [address] )   Nicotine dependence      Admitting Diagnosis: Opioid withdrawal ([place] [address] ) [F11 23]  Age/Sex: 32 y o  male  Admission Orders:  Regular Diet  SCDs  Fall & Seizure Precautions  SEWS monitoring  Telemetry & Continuous Pulse Ox      Scheduled Medications:  enoxaparin, 40 mg, Subcutaneous, Daily  folic acid, 1 mg, Oral, Daily  multivitamin-minerals, 1 tablet, Oral, Daily  nicotine, 1 patch, Transdermal, Daily  thiamine, 100 mg, Oral, Daily  transdermal buprenorphine, 20 mcg, Transdermal, Q7 Days    Continuous IV Infusions:  sodium chloride, 100 mL/hr, Intravenous, Continuous    PRN Meds:  acetaminophen, 650 mg, Oral, Q6H PRN  cloNIDine, 0 1 mg, Oral, Q6H PRN; 8/12 x1  gabapentin, 300 mg, Oral, Q8H PRN; 8/12 x1  ondansetron, 4 mg, Intravenous, Q6H PRN  phenobarbital, 260 mg, Intravenous; 8/12 x1  potassium chloride, 40 mEq, Oral; 8/12 x1  potassium chloride, 20 mEq, Intravenous; 8/12 x1  traZODone, 50 mg, Oral, HS PRN        IP CONSULT TO CASE MANAGEMENT    Network Utilization Review Department  ATTENTION: Please call with any questions or concerns to [phone] and carefully listen to the prompts so that you are directed to the right person  All voicemails are confidential   Marcel Moy all requests for admission clinical reviews, approved or denied determinations and any other requests to dedicated fax number below belonging to the campus where the patient is receiving treatment   List of dedicated fax numbers for the Facilities:  [address] DENIALS (Administrative/Medical Necessity) [phone]   [address] (Maternity/NICU/Pediatrics) [phone]   [address] [place] [address] [address] [place] [address]  [address] [facility] [place] [address] [place] [address] [address] [place] [address] [address] [place] [address] [facility] [phone]

## 2022-08-12 NOTE — ASSESSMENT & PLAN NOTE
· Pt with a h/o chronic heavy alcohol use  · Drinks 1 pt of liquor daily  · Prior inpatient treatment hx  · Withdrawal management as above  · Continue daily thiamine/folic acid supplementation, and MVI  · Consult case management for assistance with rehab resources - pt initially interested in inpatient rehabilitation, however left AMA

## 2022-08-12 NOTE — ASSESSMENT & PLAN NOTE
· Patient with a history of chronic opioid use  · Last reported use was 1 5-2 days ago  · However, pt somnolent with soft BP upon arrival and found with a makeshift apparatus used to smoke fentanyl in his pocket  · Initiate COWS protocol with plan for eventual microinduction with Suboxone  · Currently experiencing no significant opioid withdrawal s/sx at this time  · Initiate Butrans 20 mcg/hr patch   · Continue monitoring opioid withdrawal, and consider microinduction when 24-48 hours have passed since last opioid use  · Supportive care  · Continuous pulse oximetry monitoring

## 2022-08-12 NOTE — NURSING NOTE
Pt reporting he wants to leave to "smoke a cigarette"  Provider made aware  AMA form reviewed and signed  Belongings returned to pt  Narcan spray provided  Pt escorted out

## 2022-08-12 NOTE — DISCHARGE SUMMARY
[facility]  Discharge- Denae Moss 1995, 32 y o  male MRN: [id number]  Unit/Bed#: 5T DETOX 505-01 Encounter: [id number]  Primary Care Provider: No primary care provider on file  Date and time admitted to hospital: 8/11/2022  9:45 PM    Discharging Physician / Practitioner: Sera Chu PA-C  PCP: No primary care provider on file    Admission Date:   Admission Orders (From admission, onward)     Ordered        08/11/22 2154  Inpatient Admission  Once                      Discharge Date: 08/12/22    Medical Problems             Resolved Problems  Date Reviewed: 8/11/2022   None               * Alcohol withdrawal syndrome with complication [facility])  Assessment & Plan  · Patient with a history of chronic daily alcohol use, H/o withdrawal seizures  · Last drink 2 days ago  · Serum ethanol <3 (8/11/2022 1851)  · Received Ativan 1 mg in the ED PTA  · SEWS protocol with symptom-triggered phenobarbital followed for medical management of alcohol withdrawal  · Patient received 260 mg total phenobarbital, last dose administered 8/12/2022 0556  · Currently presents with anxiety/agitation; otherwise, VSS, no tremors  · Currently A&Ox4, ambulating independently   · Despite education of importance of remaining in the hospital for ongoing withdrawal management, patient signed out A    Opioid withdrawal ([facility] [address] )  Assessment & Plan  · Patient with a history of chronic opioid use  · Last reported use was 1 5-2 days ago  · However, pt was initially somnolent with soft BP upon arrival and found with a makeshift apparatus used to smoke fentanyl in his pocket  · Suspect possible use just prior to admission  · suboxone MAT protocol initiated on admission  · 1 Butrans 20 mcg/hr patch applied on admission (removed prior to discharge)  · plan was to initiate micro-induction with suboxone later today or tomorrow  · Currently presents with anxiety/agitation, restlessness  · Despite education of importance of remaining in the hospital for ongoing withdrawal management, patient signed out AMA    Hypokalemia  Assessment & Plan  · CMP this AM revealed K+ 3 1  · Mg 1 8  · Supplementation administered  · Continue to monitor- patient left AMA, provided with lab script for outpatient BMP for ongoing monitoring    Homelessness  Assessment & Plan  · Pt currently homeless  · Consult case management for assistance with aftercare resources - pt was interested in inpatient rehab upon d/c, however left AMA    Opioid use disorder, severe, dependence ([facility] [address] )  Assessment & Plan  · Patient with a history of chronic fentanyl use  · Uses about 7 bags daily via smoking  · Endorses past h/o IVDU  · History of prior Suboxone therapy in IP rehab  · Management of opioid withdrawal under MAT protocol as above  · Screen for hepatitis/HIV with h/o IVDU- both screening panels non-reactive   · Case management consulted for assistance with rehab resources - pt initially interested  in inpatient rehabilitation, however left AMA  · Provided patient with narcan kit upon discharge     Alcohol use disorder, severe, dependence ([facility] [address] )  Assessment & Plan  · Pt with a h/o chronic heavy alcohol use  · Drinks 1 pt of liquor daily  · Prior inpatient treatment hx  · Withdrawal management as above  · Continue daily thiamine/folic acid supplementation, and MVI  · Consult case management for assistance with rehab resources - pt initially interested in inpatient rehabilitation, however left AMA    Nicotine dependence  Assessment & Plan  · Pt smokes 1 ppd of cigarettes  · Offer NRT - refused  · Encourage cessation    Consultations During Hospital Stay:  · Case management     Procedures Performed:   · none    Significant Findings / Test Results:   · Hypokalemia  · HIV and acute hepatitis panel non-reactive    Incidental Findings:   · none     Test Results Pending at Discharge (will require follow up):   · none     Outpatient Tests Requested:  · BMP- potassium    Complications:  Left AMA    Reason for Admission: alcohol and opioid withdrawal    Hospital Course:     Martina Landers is a 32 y o  male patient PMH AUD with h/o withdrawal seizures, OUD, h/o IVDU who originally presented to the hospital on 8/11/2022 due to alcohol and opioid withdrawal  Patient originally presented to [facility] ED 8/11/2022 for alcohol and fentanyl detox  Received 1 mg ativan in ED PTA  Patient subsequently admitted to [facility] detox unit for medically-assisted alcohol withdrawal  SEWS protocol with symptom-triggered phenobarbital was followed during admission  Patient received 260 mg total phenobarbital, last dose administered 8/12/2022 around 6 am  For opioid withdrawal management, suboxone MAT protocol initiated on admission: 1 Butrans 20 mcg/hr patch applied on admission  Plan was to initiate micro-induction with suboxone later today or tomorrow, however patient demanded to leave  Patient reported to nursing that he needed to leave to smoke a cigarette  Upon evaluation, patient stated he wanted to go outside to smoke a cigarette and said he would "come right back " Patient was offered nicotine patch and nicorette gum, both of which he refused  Explained to patient that he could not leave the unit and come back due to hospital policy  Patient then stated he would leave and go back to the ED to try to come back to the unit, patient informed that he would not be accepted back if he left  Educated patient regarding importance of remaining in the hospital for ongoing treatment of both alcohol and fentanyl withdrawal  Patient's last dose of phenobarbital was around 6 am this morning (approx  10 hrs ago)  At time of departure, patient appears anxious/restless; VSS, no tremors, A&Ox4 and ambulating independently  Despite education of risk of worsening withdrawal symptoms if he were to leave, patient stated smoking cigarettes was "all he had left" and he signed out AMA   Patient informed of risks of leaving including worsening withdrawal symptoms and risk of overdose/death with further substance use  Patient expressed understanding and signed AMA form  Patient provided with narcan kit on discharge, nursing education provided for use  CM attempted to notify patient's mother via phone of patient leaving AMA, voicemail left  Detox attending on-call, Dr Benjy Cazares, was notified of patient leaving AMA  The patient, initially admitted to the hospital as inpatient, was discharged earlier than expected given the following: left AMA  Please see above list of diagnoses and related plan for additional information  Condition at Discharge: fair     Discharge Day Visit / Exam:     * Please refer to separate progress note for these details *    Discussion with Family: I personally did not discuss patient with family at this time  Discussed current plan with patient, answered all questions to best of my ability  Patient's mother was notified via telephone by CM  Discharge instructions/Information to patient and family:   See after visit summary for information provided to patient and family  Provisions for Follow-Up Care:  See after visit summary for information related to follow-up care and any pertinent home health orders  Disposition:     Home    For Discharges to [facility] SNF:   · Not Applicable to this Patient - Not Applicable to this Patient    Planned Readmission: none     Discharge Statement:  I spent 37 minutes discharging the patient  This time was spent on the day of discharge  I had direct contact with the patient on the day of discharge  Greater than 50% of the total time was spent examining patient, answering all patient questions, arranging and discussing plan of care with patient as well as directly providing post-discharge instructions  Additional time then spent on discharge activities      Discharge Medications:  See after visit summary for reconciled discharge medications provided to patient and family        ** Please Note: This note has been constructed using a voice recognition system **

## 2022-08-12 NOTE — ASSESSMENT & PLAN NOTE
· Patient with a history of chronic fentanyl use  · Uses about 7 bags daily via smoking  · Endorses past h/o IVDU  · History of prior Suboxone therapy in IP rehab  · Management of opioid withdrawal under MAT protocol as above  · Screen for hepatitis/HIV with h/o IVDU- both screening panels non-reactive   · Case management consulted for assistance with rehab resources - pt initially interested  in inpatient rehabilitation, however left AMA  · Provided patient with narcan kit upon discharge

## 2022-08-12 NOTE — NURSING NOTE
Upon admission patient's belongings were searched, found pen and fork used to smoke fentanyl  These were disposed of properly

## 2022-08-12 NOTE — PROGRESS NOTES
Progress Note - Medical Toxicology    Tim Slice 32 y o  male MRN: [id number]  Unit/Bed#: 5T DETOX 505-01 Encounter: [id number]  [address] [facility], LEVEL 4  Department of Medical Toxicology  Reason for Admission/Principal Problem: alcohol and opioid withdrawal  Rounding Provider: Julianna Bay MD, Melissa Sinha DO         Homelessness  Assessment & Plan  · Pt currently homeless  · Consult case management for assistance with aftercare resources - pt interested in inpatient rehab after d/c    Opioid withdrawal ([place] [address] )  Assessment & Plan  · Patient with a history of chronic opioid use  · Last reported use was 1 5-2 days ago  · However, pt was initially somnolent with soft BP upon arrival and found with a makeshift apparatus used to smoke fentanyl in his pocket  · Continue COWS protocol with plan for eventual microinduction with Suboxone  · Currently experiencing no significant opioid withdrawal s/sx at this time  · Butrans 20 mcg/hr patch applied  · Continue monitoring opioid withdrawal, and consider microinduction when 24-48 hours have passed since last opioid use  · Supportive care  · Continuous pulse oximetry monitoring    Opioid use disorder, severe, dependence ([place] [address] )  Assessment & Plan  · Patient with a history of chronic fentanyl use  · Uses about 7 bags daily via smoking  · Endorses past h/o IVDU  · History of prior Suboxone therapy in IP rehab  · Management of opioid withdrawal under MAT protocol as above  · Screen for hepatitis/HIV with h/o IVDU  · Case management consulted for assistance with rehab resources - pt expresses interest in inpatient rehabilitation after d/c    Alcohol use disorder, severe, dependence ([facility] )  Assessment & Plan  · Pt with a h/o chronic heavy alcohol use  · Drinks 1 pt of liquor daily  · H/o withdrawal seizures  · Prior inpatient treatment hx  · Withdrawal management as above  · Initiate IVFs, daily thiamine/folic acid supplementation, and MVI  · Consult case management for assistance with rehab resources - pt interested in inpatient rehabilitation at this time    Nicotine dependence  Assessment & Plan  · Pt smokes 1 ppd of cigarettes  · Offer NRT   · Encourage cessation    * Alcohol withdrawal syndrome with complication ([address] )  Assessment & Plan  · Patient with a history of chronic daily alcohol use  · Last drink 2 days ago  · Serum alcohol <3 in the ED  · Received Ativan 1 mg in the ED PTA  · Initiate SEWS protocol for medical management of alcohol withdrawal  · Pt displays no current s/sx of alcohol withdrawal  · Continue monitoring under protocol and administer phenobarbital as indicated when pt develops s/sx of alcohol withdrawal  · Continuous pulse ox and telemetry monitoring          VTE Pharmacologic Prophylaxis:   Pharmacologic: Enoxaparin (Lovenox)  Mechanical VTE Prophylaxis in Place: yes    Code Status: Level 1 - Full Code    Patient Centered Rounds: I have performed bedside rounds with nursing staff today  Discussions with Specialists or Other Care Team Provider: Case Management   Education and Discussions with Family / Patient: Discussed with patient need for phenobarbital use for management of adverse alcohol withdrawal symptoms  Educated on use of Butrans patch for management of opioid withdrawal symptoms  Patient expressed understanding and agreed with plan of care at this time  Time Spent for Care: 20 minutes  More than 50% of total time spent on counseling and coordination of care as described above  Current Length of Stay: 1 day(s)    Current Patient Status: Inpatient     Certification Statement: The patient will continue to require additional inpatient hospital stay due to management of alcohol withdrawal as well as opioid withdrawal with eventual microinduction  Discharge Plan: Provided patient does not have worsening symptoms due to alcohol/opioid withdrawal will begin microinduction and likely discharge patient to desired rehabilitation      Total time spent today 20 minutes  Greater than 50% of total time was spent with the patient and / or family counseling and / or coordination of care  A description of the counseling / coordination of care: Councelled patient on ceasation methods as well as medical management of withdrawal   Encouraged cessation and rehabilitation  Patient expressed understanding and agreed with plan of care at this time  Subjective:   31 yo male with pmhx of alcohol use disorder, and opioid use disorder presents requesting detoxification  Patient reports he usually drinks roughly a pint of whiskey a day and that he also uses 5-7 bags of fentanyl daily via smoking  Patient notes he last drank 3 days ago and his last use of fentanyl was 2 days ago  Patient requests at this time to have a cigarette  Patient denies nausea, AV hallucinations, feeling nervous, chest pain, abd pain, sob, or any other concerns at this time       Objective:     Clinical Opiate Withdrawal Scale  Pulse: 72    SEWS Total Score: 2 (8/12/2022  9:05 AM)        Last 24 Hours Medication List:   Current Facility-Administered Medications   Medication Dose Route Frequency Provider Last Rate    acetaminophen  650 mg Oral Q6H PRN Valerie Agarwal PA-C      cloNIDine  0 1 mg Oral Q6H PRN Valerie Agarwal PA-C      enoxaparin  40 mg Subcutaneous Daily [place], [place]      folic acid  1 mg Oral Daily Valerie Agarwal PA-C      gabapentin  300 mg Oral Q8H PRN RITA Dooley-JESSICA      multivitamin-minerals  1 tablet Oral Daily Valerieamy Agarwal [place]      nicotine  1 patch Transdermal Daily Valerie Deladelfo Agarwal, [place]      ondansetron  4 mg Intravenous Q6H PRN Jace Franklin PA-C      sodium chloride  100 mL/hr Intravenous Continuous Jace Franklin PA-C 100 mL/hr (08/12/22 0808)    thiamine  100 mg Oral Daily Valerie Agarwal PA-C      transdermal buprenorphine  20 mcg Transdermal Q7 Days Boneta Haste, PA-C      traZODone  50 mg Oral HS PRN Boneta Haste, PA-C           Vitals:   Temp (24hrs), Av 8 °F (37 1 °C), Min:98 5 °F (36 9 °C), Max:99 °F (37 2 °C)    Temp:  [98 5 °F (36 9 °C)-99 °F (37 2 °C)] 99 °F (37 2 °C)  HR:  [] 72  Resp:  [16-18] 18  BP: ()/(65-88) 137/70  SpO2:  [97 %-100 %] 99 %  Body mass index is 19 73 kg/m²  Input and Output Summary (last 24 hours): Intake/Output Summary (Last 24 hours) at 2022 1005  Last data filed at 2022 3181  Gross per 24 hour   Intake 1480 ml   Output --   Net 1480 ml       Physical Exam:   Physical Exam  Vitals and nursing note reviewed  Constitutional:       Appearance: He is well-developed  HENT:      Head: Normocephalic and atraumatic  Right Ear: External ear normal       Left Ear: External ear normal       Nose: Nose normal       Mouth/Throat:      Mouth: Mucous membranes are moist       Comments: Tongue fasciculations   Eyes:      Conjunctiva/sclera: Conjunctivae normal    Cardiovascular:      Rate and Rhythm: Normal rate and regular rhythm  Heart sounds: No murmur heard  Pulmonary:      Effort: Pulmonary effort is normal  No respiratory distress  Breath sounds: Normal breath sounds  Abdominal:      Palpations: Abdomen is soft  Tenderness: There is no abdominal tenderness  Musculoskeletal:         General: Normal range of motion  Cervical back: Normal range of motion  Skin:     General: Skin is warm and dry  Neurological:      Mental Status: He is alert and oriented to person, place, and time  Motor: Tremor (mild, BL UE) present     Psychiatric:         Mood and Affect: Mood normal          Additional Data:     Labs: keep all most recent labs as listed on admission templates   Results from last 7 days   Lab Units 22  0457 22  1851   WBC Thousand/uL 7 36 9 98   HEMOGLOBIN g/dL 12 9 14 7   HEMATOCRIT % 37 7 41 8   PLATELETS Thousands/uL 274 334 NEUTROS PCT %  --  80*   LYMPHS PCT %  --  12*   MONOS PCT %  --  8   EOS PCT %  --  0      Results from last 7 days   Lab Units 08/12/22  0457   SODIUM mmol/L 139   POTASSIUM mmol/L 3 1*   CHLORIDE mmol/L 111*   CO2 mmol/L 22   BUN mg/dL 7   CREATININE mg/dL 0 66*   ANION GAP mmol/L 6   CALCIUM mg/dL 8 4   ALBUMIN g/dL 4 0   TOTAL BILIRUBIN mg/dL 0 56   ALK PHOS U/L 66   ALT U/L 24   AST U/L 26   GLUCOSE RANDOM mg/dL 100*                              * I Have Reviewed All Lab Data Listed Above  * Additional Pertinent Lab Tests Reviewed: [address] Admission Reviewed      Imaging Studies: I have personally reviewed pertinent reports  Recent Cultures (last 7 days): Today, Patient Was Seen By: Ria Matamoros MD    ** Please Note: Dictation voice to text software may have been used in the creation of this document   **

## 2022-08-12 NOTE — DISCHARGE INSTR - OTHER ORDERS
[place] [address] [facility]  The [place] [address] provides safe housing, nutritionally balanced meals, clothing, access to medical care, and a wide variety of local social service and support organizations for men 25years old and older  How to access our Emergency Shelter:  Come to the Emergency Shelter at [address], during regular business hours (8:30 am - 5:00 pm) with a valid ID  During the COVID-19 pandemic our Emergency Shelter is open 24 hours a day  Call us at [phone] ext [phone] at any time and we will give you instructions on how to access our facility and to learn more about the services we provide  We offer our services to all men over 18 regardless of race, color, creed, political affiliation, Bahai affiliation, or sexual orientation  If youre under the age of 25, a woman, or have dependent children, we recommend you call 211 from any phone to find shelter and other services  211 is the [facility] direct phone line for "coordinated care" in the [facility] region  They have expertise and access to a wide range of housing options  If you are a man over the age of 25, without dependent children living with you, they will very likely direct you back to us  But if you are in any other demographic or special needs group, they have numerous options available for you  Call 211      For assistance in obtaining Substance Use treatment:    [facility]: [phone]  [place]: [address]: [address] [place] [address]: [phone]  [place]: [phone]  [place]: [address]: [address] [place] [address] North: [address] [place] [address] Street: [phone]    Alcoholics Anonymous: [phone]  Narcotics Anonymous: [phone]

## 2022-08-12 NOTE — ASSESSMENT & PLAN NOTE
· Patient with a history of chronic opioid use  · Last reported use was 1 5-2 days ago  · However, pt somnolent upon arrival and found with a makeshift apparatus used to smoke fentanyl in his pocket  · Initiate COWS protocol with plan for eventual microinduction with Suboxone  · Currently experiencing no significant opioid withdrawal s/sx at this time  · Initiate Butrans 20 mcg/hr patch   · Continue monitoring opioid withdrawal, and consider microinduction when 24-48 hours have passed since last opioid use  · Supportive care  · Continuous pulse oximetry monitoring

## 2022-08-12 NOTE — H&P
[address] [facility]  H&P- Kira Blood 1995, 32 y o  male MRN: [id number]  Unit/Bed#: 5T DETOX 505-01 Encounter: [id number]  Primary Care Provider: No primary care provider on file     Date and time admitted to hospital: 8/11/2022  9:45 PM  HISTORY & PHYSICAL EXAM  DEPARTMENT OF MEDICAL TOXICOLOGY  LEVEL 4 MEDICAL DETOX UNIT  Kira Blood 32 y o  male MRN: [id number]  Unit/Bed#:  DETOX 505-01 Encounter: [id number]      Reason for Admission/Principal Problem: Ethanol withdrawal, Ethanol use disorder  Admitting Provider: Fransisco Nova PA-C  Attending Provider: Hemant Rodrigues DO   8/11/2022  9:45 PM        * Alcohol withdrawal syndrome with complication [facility])  Assessment & Plan  · Patient with a history of chronic daily alcohol use  · Last drink yesterday  · Serum alcohol <3 in the ED  · Received Ativan 1 mg in the ED PTA  · Initiate SEWS protocol for medical management of alcohol withdrawal  · Pt displays no current s/sx of alcohol withdrawal  · Hold on administering phenobarbital at this time as pt in NAD and RAAS -1  · Continue monitoring under protocol and administer phenobarbital as indicated when pt develops s/sx of alcohol withdrawal  · Continuous pulse ox and telemetry monitoring    Alcohol use disorder, severe, dependence ([facility] [address] )  Assessment & Plan  · Pt with a h/o chronic heavy alcohol use  · Drinks 1 pt of liquor daily  · H/o withdrawal seizures  · Prior inpatient treatment hx  · Withdrawal management as above  · Initiate IVFs, daily thiamine/folic acid supplementation, and MVI  · Consult case management for assistance with rehab resources - pt interested in inpatient rehabilitation at this time    Opioid withdrawal [facility])  Assessment & Plan  · Patient with a history of chronic opioid use  · Last reported use was 1 5-2 days ago  · However, pt somnolent with soft BP upon arrival and found with a makeshift apparatus used to smoke fentanyl in his pocket  · Initiate COWS protocol with plan for eventual microinduction with Suboxone  · Currently experiencing no significant opioid withdrawal s/sx at this time  · Initiate Butrans 20 mcg/hr patch   · Continue monitoring opioid withdrawal, and consider microinduction when 24-48 hours have passed since last opioid use  · Supportive care  · Continuous pulse oximetry monitoring    Opioid use disorder, severe, dependence (HCC)  Assessment & Plan  · Patient with a history of chronic fentanyl use  · Uses about 7 bags daily via smoking  · Endorses past h/o IVDU  · History of prior Suboxone therapy in IP rehab  · Management of opioid withdrawal under MAT protocol as above  · Screen for hepatitis/HIV with h/o IVDU  · Case management consulted for assistance with rehab resources - pt expresses interest in inpatient rehabilitation after d/c    Homelessness  Assessment & Plan  · Pt currently homeless  · Consult case management for assistance with aftercare resources - pt interested in inpatient rehab after d/c    Nicotine dependence  Assessment & Plan  · Pt smokes 1 ppd of cigarettes  · Offer NRT   · Encourage cessation             VTE Prophylaxis: Enoxaparin (Lovenox)  / sequential compression device   Code Status: full code      Anticipated Length of Stay:  Patient will be admitted on an Inpatient basis with an anticipated length of stay of  >2  midnights  Justification for Hospital Stay: alcohol withdrawal, alcohol use disorder    For any questions or concerns, please Tiger Text the advanced practitioner in the role of [facility]-DETOX-AP On Call      This patient qualifies for Level IV medically managed intensive inpatient services under the criteria set by the American Society of Addiction Medicine, including dimensions 1-3   The patient is in withdrawal (or is intoxicated with high risk of withdrawal), with severe and unstable medical and/or psychiatric (dual diagnosis) problems, requiring requires 24-hour medical and nursing care and the full resources of a [address] ALCOHOL WITHDRAWAL    Admit patient to medical detox unit and continue supportive care and stabilization of acute ethanol withdrawal per medical toxicology/detox treatment pathway  Monitor ethanol withdrawal severity via the Severity of Ethanol Withdrawal Scale (SEWS) Q4 hours and then hourly if/when SEWS > 6  Treat withdrawal per pathway and reassess Q30-60 minutes  Mild SEWS Score 1-6  Administer medications* (IV or PO; PO preferred):   If initial SEWS score: diazepam 10mg PO/IV x 1 AND phenobarbital 65 mg PO/IV x 1   If repeat SEWS score 1-6: phenobarbital 65 mg PO/IV q1 hour x 5 doses maximum   Reassessment:    SEWS q1 hour after each dose until SEWS 0 x 2 hours   VS q1 hours (until SEWS 0, then q4 hours)   Notify provider for bedside evaluation if 5-dose maximum is reached, RASS of -3 to -5, or SEWS score escalates to moderate or severe  Moderate SEWS Score 7-12  Administer medications* (IV):   If initial SEWS score: diazepam 10mg IV x 1 AND phenobarbital 260 mg IV x 1   If repeat SEWS score 7-12 or score escalated from mild: phenobarbital 130 mg IV q30 minutes x 5 doses maximum   Reassessment:   SEWS q30 minutes after each dose until SEWS < 7 (then hourly until SEWS 0 x 2 hours)   VS q30 minutes until SEWS < 7 (then hourly until SEWS 0, then q4 hours)   Notify provider for bedside evaluation if 5-dose maximum is reached, RASS of -3 to -5, or SEWS score escalates to severe  Severe SEWS Score ? 13  Administer medications* (IV):   If initial SEWS score: Diazepam 10 mg IV x 1 AND phenobarbital 650 mg IV piggyback x 1 over 15-30 minutes   If repeat SEWS score ?  13 or score escalated from mild or moderate: phenobarbital 130 mg IV q30 minutes x 5 doses maximum   Reassessment:   SEWS q30 minutes after each dose until SEWS < 7 (then hourly until SEWS 0 x 2 hours)    VS q30 minutes until SEWS < 7 (then hourly until SEWS 0, then q4 hours)   Notify provider for bedside evaluation if 5-dose maximum is reached or RASS of -3 to -5   *Hold medications and notify provider if CNS depression, respirations < 10/min, or RASS of -3 to -5  Medications to be administered adjunctively if more than 2 grams of phenobarbital is needed for stabilization of withdrawal; require attending approval     Dexmedetomidine infusion 0 1-1mcg/kg/hr IV infusion, titratable to reduced agitation (Goal: RASS -2)   Ketamine   o Acute agitated delirium: 1-2 mg/kg IV or 4-5 mg/kg IM  o Refractory withdrawal: 0 1-1mg/kg/hr IV infusion, titratable to reduced agitation (Goal: RASS -2)    Further evaluation, screening and treatment:  Evaluate complete metabolic panel, transaminases, INR, and lipase  Assess hepatic ultrasound for any sign of alcoholic liver disease or cirrhosis, and ultimately refer for further hepatic evaluation and care as/if indicated  Additional medications for ethanol associated malnutrition: Thiamine 100 mg IV daily, increase to 500 mg TID for signs/symptoms of Wernicke's Encephalopathy or Wernicke Korsakoff Syndrome   Folic acid 1 mg IV daily   Multivitamin PO daily      Will offer first monthly injection of Naltrexone 380 mg IM, once patient is stabilized, as it has been shown to assist in decreasing cravings for ethanol  Evaluate and treat for coexisting substance use, such as opioids and nicotine  Discuss risk factors for infectious disease, such as history of intravenous drug abuse, and offer hepatitis and HIV screening if indicated  Case management consultation to assist with coordination of subsequent treatment after discharge  Hx and PE limited by: pt somnolent and withdrawn    HPI: Sunday Olivia is a 32y o  year old male with a PMH of alcohol use disorder, alcohol withdrawal seizures, and OUD who presents seeking detoxification from alcohol and fentanyl   Patient initially presented to the [address]  [facility] ED requesting detox and was subsequently transferred to the [facility] medical detox unit for medical management of alcohol withdrawal and initiation of MAT with Suboxone  Pt reports drinking 1 pint of liquor daily and and his last drink was yesterday  Serum alcohol was negative in the ED  Patient also endorses daily fentanyl use, smoking about 7 bags daily  He states that his last fentanyl use was 1 5-2 days ago; however, pt was somnolent upon arrival to the unit and found in his pocket was a pen and fork that he uses to smoke fentanyl  He endorses a prior h/o IVDU  Patient is not displaying any current s/sx of alcohol or opioid withdrawal, including anxiety, N/V/D, diaphoresis, chills, tremors, myalgias/arthralgias, restlessness, rhinorrhea/lacrimation, or AVH  Patient endorses history of alcohol withdrawal seizures  He reports a past [facility] treatment history consisting of inpatient rehabilitation and Mat with Suboxone while in rehab  Patient reports he is homeless and is interested in inpatient rehabilitation after discharge  Preferred alcoholic beverage(s): whiskey  Quantity and frequency of alcohol intake: 1 pt of liquor daily  Use of any ethanol substitutes (toxic alcohols): no  Date/Time of last alcohol intake: yesterday  Current signs and symptoms of ethanol withdrawal: none    SEWS Total Score: 0 (8/11/2022 10:20 PM)        Ethanol Withdrawal History  Previous ethanol withdrawal? yes  Prior inpatient treatment for ethanol withdrawal? yes  Prior outpatient treatment for ethanol withdrawal? no  History of seizures with prior ethanol withdrawal? yes  Prior treatment with naltrexone (Vivitrol)? no  Current treatment with naltrexone (Vivitrol)? no  Other current treatment for ethanol use disorder?  no  Co-existing substance use? no    Opioids currently used: fentanyl  Route of use: smoking  Date/Time of Last Opioid Use: pt reports last use 1 5-2 days ago; however, pt somnolent and found with a makeshift fentanyl smoking apparatus on his person on admission  Current Signs/Symptoms of Opioid Withdrawal: none    COWS score:   Clinical Opiate Withdrawal Scale  Pulse: 66      Methadone & Buprenorphine History  History of prior treatment for opioid dependence? yes  Currently on Methadone Maintenance? no  History of prior treatment with Suboxone? yes  Currently taking Suboxone? no  History of using Suboxone without having a prescription? no  History of IVDA? yes  Co-existing substance use? no      Review of PDMP: yes, no prescriptions    Social History     Substance and Sexual Activity   Alcohol Use Yes    Comment: patient reports pint of whiskey daily  Social History     Substance and Sexual Activity   Drug Use Yes    Types: Fentanyl     Social History     Tobacco Use   Smoking Status Current Some Day Smoker    Packs/day: 1 00   Smokeless Tobacco Never Used       Review of Systems   Reason unable to perform ROS: pt somnolent  Historical Information   Past Medical History:   Diagnosis Date    Alcohol abuse      No past surgical history on file  Family History   Problem Relation Age of Onset    Hypertension Mother     Hypertension Father      Social History   Marital Status: Single   Patient Pre-hospital Living Situation:  Homeless  Patient Pre-hospital Level of Mobility:  Independent  Patient Pre-hospital Diet Restrictions:  None    No Known Allergies    Prior to Admission medications    Medication Sig Start Date End Date Taking?  Authorizing Provider   chlordiazePOXIDE (LIBRIUM) 25 mg capsule 50mg PO tid x 3d, then 25mg PO tid x 3d, then 25mg PO q8hr prn withdrawal 4/11/20   Luke Askew PA-C       Current Facility-Administered Medications   Medication Dose Route Frequency    acetaminophen (TYLENOL) tablet 650 mg  650 mg Oral Q6H PRN    cloNIDine (CATAPRES) tablet 0 1 mg  0 1 mg Oral Q6H PRN    enoxaparin (LOVENOX) subcutaneous injection 40 mg  40 mg Subcutaneous Daily    folic acid (FOLVITE) tablet 1 mg  1 mg Oral Daily    gabapentin (NEURONTIN) capsule 300 mg  300 mg Oral Q8H PRN    multivitamin-minerals (CENTRUM) tablet 1 tablet  1 tablet Oral Daily    nicotine (NICODERM CQ) 21 mg/24 hr TD 24 hr patch 1 patch  1 patch Transdermal Daily    ondansetron (ZOFRAN) injection 4 mg  4 mg Intravenous Q6H PRN    sodium chloride 0 9 % infusion  100 mL/hr Intravenous Continuous    thiamine tablet 100 mg  100 mg Oral Daily    transdermal buprenorphine (BUTRANS) 20 mcg/hr TD patch 20 mcg  20 mcg Transdermal Q7 Days    traZODone (DESYREL) tablet 50 mg  50 mg Oral HS PRN       Continuous Infusions:sodium chloride, 100 mL/hr, Last Rate: 100 mL/hr (08/11/22 2217)             Objective     No intake or output data in the 24 hours ending 08/12/22 0141    Invasive Devices:   Peripheral IV 08/11/22 Right Antecubital (Active)   Site Assessment WDL 08/11/22 1818   Dressing Type Gauze 08/11/22 1818   Line Status Blood return noted;Positional 08/11/22 1818   Dressing Status Clean;Dry; Intact 08/11/22 1818       Vitals   Vitals:    08/11/22 2158   BP: 98/76   TempSrc: Temporal   Pulse: 66   Resp: 18   Patient Position - Orthostatic VS: Lying   Temp: 98 8 °F (37 1 °C)       Physical Exam  Vitals reviewed  Constitutional:       General: He is not in acute distress  Appearance: He is normal weight  He is not diaphoretic  Comments: Patient somnolent but easily arouses to voice   HENT:      Head: Normocephalic and atraumatic  Right Ear: External ear normal       Left Ear: External ear normal       Nose: Nose normal       Mouth/Throat:      Mouth: Mucous membranes are moist       Comments: Mild tongue fasciculations present  Eyes:      Conjunctiva/sclera: Conjunctivae normal       Pupils: Pupils are equal, round, and reactive to light  Comments: Unable to assess EOMs as pt will not keep eyes open   Cardiovascular:      Rate and Rhythm: Normal rate and regular rhythm  Heart sounds: Normal heart sounds   No murmur heard     No friction rub  No gallop  Pulmonary:      Effort: Pulmonary effort is normal  No respiratory distress  Breath sounds: Normal breath sounds  No wheezing, rhonchi or rales  Abdominal:      General: Bowel sounds are normal  There is no distension  Palpations: Abdomen is soft  Tenderness: There is no abdominal tenderness  There is no guarding or rebound  Musculoskeletal:         General: No swelling  Cervical back: Neck supple  Right lower leg: No edema  Left lower leg: No edema  Skin:     General: Skin is warm and dry  Findings: No rash  Neurological:      General: No focal deficit present  Mental Status: He is oriented to person, place, and time  GCS: GCS eye subscore is 3  GCS verbal subscore is 5  GCS motor subscore is 6  Cranial Nerves: No dysarthria or facial asymmetry  Motor: Tremor (BUE intention tremor present) present  Coordination: Finger-Nose-Finger Test normal    Psychiatric:         Attention and Perception: He does not perceive auditory or visual hallucinations  Mood and Affect: Mood is not anxious  Affect is blunt  Behavior: Behavior is withdrawn  Thought Content: Thought content does not include homicidal or suicidal ideation  Thought content does not include homicidal or suicidal plan  Data:    EKG, Pathology, and Other Studies: I have personally reviewed pertinent reports  EKG: NSR at 65 bpm with sinus arrhythmia, no acute ST segment/T wave changes, RAD, QTc 430       Lab Results:  CBC ETOH     Lab Results   Component Value Date    WBC 9 98 08/11/2022    WBC 7 41 10/05/2014    RBC 4 90 08/11/2022    RBC 4 96 10/05/2014    HGB 14 7 08/11/2022    HGB 14 9 10/05/2014    HCT 41 8 08/11/2022    HCT 42 2 10/05/2014    MCV 85 08/11/2022    MCV 85 10/05/2014    MCH 30 0 08/11/2022    MCH 30 0 10/05/2014    MCHC 35 2 08/11/2022    MCHC 35 3 10/05/2014    RDW 12 1 08/11/2022    RDW 13 2 10/05/2014     08/11/2022     10/05/2014    MPV 9 2 08/11/2022    MPV 9 7 10/05/2014      No results found for: LACTICACID   CMP UA         Component Value Date/Time     10/05/2014 1455    K 4 3 08/11/2022 1851    K 4 6 10/05/2014 1455     08/11/2022 1851     10/05/2014 1455    CO2 27 08/11/2022 1851    CO2 25 10/05/2014 1455    BUN 11 08/11/2022 1851    BUN 5 (L) 10/05/2014 1455    CREATININE 0 77 08/11/2022 1851    CREATININE 1 08 10/05/2014 1455         Component Value Date/Time    CALCIUM 9 4 08/11/2022 1851    CALCIUM 9 0 10/05/2014 1455    ALKPHOS 98 08/11/2022 1851    ALKPHOS 109 10/05/2014 1455    AST 38 08/11/2022 1851    AST 12 10/05/2014 1455    ALT 34 08/11/2022 1851    ALT 21 10/05/2014 1455    BILITOT 0 5 10/05/2014 1455      No results found for: CLARITYU, COLORU, SPECGRAV, PHUR, GLUCOSEU, KETONESU, BLOODU, PROTEIN UA, NITRITE, BILIRUBINUR, UROBILINOGEN, LEUKOCYTESUR, WBCUA, RBCUA, HYALINE, BACTERIA, EPIS     Liver Function Test: ASA     Lab Results   Component Value Date    TBILI 0 60 08/11/2022    ALKPHOS 98 08/11/2022    ALKPHOS 109 10/05/2014    AST 38 08/11/2022    AST 12 10/05/2014    ALT 34 08/11/2022    ALT 21 10/05/2014    TP 8 3 08/11/2022    ALB 4 5 08/11/2022    ALB 4 0 10/05/2014      Lab Results   Component Value Date    SALICYLATE 4 4 59/63/1757      Troponin APAP     No results found for: TROPONINI   Lab Results   Component Value Date    ACTMNPHEN <2 0 (L) 08/11/2022      VBG HCG     No results found for: PHVEN, YAX2RYN, PO2VEN, OFT5DOI, BEVEN, L3HUDTEJC, J8MRSBK   No results found for: HCGQUANT   ABG Urine Drug Screen     No results found for: PHART, UJM2ERG, PO2ART, YFR0PWS, BEART, H5UPMJNID, O2HGB, SOURC, NESTOR, VTAC, ACRATE, INSPIREDAIR, PEEP   Lab Results   Component Value Date    AMPMETHUR Negative 08/07/2022    BARBTUR Negative 08/07/2022    BARBTUR Negative 10/05/2014    BDZUR Negative 08/07/2022    BDZUR Negative 10/05/2014    COCAINEUR Negative 08/07/2022    COCAINEUR Negative 10/05/2014    METHADONEUR Negative 08/07/2022    METHADONEUR Negative 10/05/2014    OPIATEUR Negative 08/07/2022    OPIATEUR Negative 10/05/2014    PCPUR Negative 08/07/2022    PCPUR Negative 10/05/2014    THCUR Negative 08/07/2022    THCUR Negative 10/05/2014    OXYCODONEUR Negative 08/07/2022      Lactate INR     No results found for: LACTICACID   No results found for: INR   PTT Protime     No results found for: PTT     No results found for: PROTIME           Imaging Studies: I have personally reviewed pertinent reports  Counseling / Coordination of Care  Total floor / unit time spent today 50 minutes  Greater than 50% of total time was spent with the patient and / or family counseling and / or coordination of care  Minutes of critical care time 28  -Critical care time was exclusive of separately billable procedures and teaching time    -Critical care was necessary to treat or prevent imminent or life-threatening deterioration of the following condition: CNS failure/compromise, toxidrome (ethanol withdrawal),  toxidrome and withdrawal  -Critical care time was spent personally by me on the following activities as well as the above as per the course and rest of chart: obtaining history from patient/surrogate, development of a treatment plan, discussions with referring provider(s), evaluation of patient's response to the treatment, examination of the patient, performing treatments and interventions, re-evaluation of the patient's condition, review of old charts, ordering/interpreting laboratory studies, ordering/interpreting of radiographic studies  ** Please Note: This note has been constructed using a voice recognition system   **

## 2022-08-12 NOTE — EMTALA/ACUTE CARE TRANSFER
[facility] EMERGENCY DEPARTMENT  [address]   [facility] [address]  Dept: [phone]      EMTALA TRANSFER CONSENT    NAME Harini Ortiz                                         1995                              MRN [id number]    I have been informed of my rights regarding examination, treatment, and transfer   by Dr Reddy Oshea DO    Benefits: Specialized equipment and/or services available at the receiving facility (Include comment)________________________    Risks: Potential for delay in receiving treatment, Potential deterioration of medical condition, Loss of IV, Increased discomfort during transfer, Possible worsening of condition or death during transfer      Consent for Transfer:  I acknowledge that my medical condition has been evaluated and explained to me by the emergency department physician or other qualified medical person and/or my attending physician, who has recommended that I be transferred to the service of  Accepting Physician: Dr Ko Sharpe at [address] Name, [address] : [facility]  The above potential benefits of such transfer, the potential risks associated with such transfer, and the probable risks of not being transferred have been explained to me, and I fully understand them  The doctor has explained that, in my case, the benefits of transfer outweigh the risks  I agree to be transferred  I authorize the performance of emergency medical procedures and treatments upon me in both transit and upon arrival at the receiving facility  Additionally, I authorize the release of any and all medical records to the receiving facility and request they be transported with me, if possible  I understand that the safest mode of transportation during a medical emergency is an ambulance and that the Hospital advocates the use of this mode of transport   Risks of traveling to the receiving facility by car, including absence of medical control, life sustaining equipment, such as oxygen, and medical personnel has been explained to me and I fully understand them  (SARITA CORRECT BOX BELOW)  [  ]  I consent to the stated transfer and to be transported by ambulance/helicopter  [  ]  I consent to the stated transfer, but refuse transportation by ambulance and accept full responsibility for my transportation by car  I understand the risks of non-ambulance transfers and I exonerate the Hospital and its staff from any deterioration in my condition that results from this refusal     X___________________________________________    DATE  22  TIME________  Signature of patient or legally responsible individual signing on patient behalf           RELATIONSHIP TO PATIENT_________________________          Provider Certification    NAME Miquel Vital                                         1995                              MRN [id number]    A medical screening exam was performed on the above named patient  Based on the examination:    Condition Necessitating Transfer The primary encounter diagnosis was Opioid withdrawal ([address] )  A diagnosis of Alcohol withdrawal (HCC) was also pertinent to this visit      Patient Condition: The patient has been stabilized such that within reasonable medical probability, no material deterioration of the patient condition or the condition of the unborn child(suyapa) is likely to result from the transfer    Reason for Transfer: Level of Care needed not available at this facility    Transfer Requirements: Facility [facility] detox   · Space available and qualified personnel available for treatment as acknowledged by    · Agreed to accept transfer and to provide appropriate medical treatment as acknowledged by       Dr Jyothi Diaz  · Appropriate medical records of the examination and treatment of the patient are provided at the time of transfer   [address] _______  · Transfer will be performed by qualified personnel from    and appropriate transfer equipment as required, including the use of necessary and appropriate life support measures  Provider Certification: I have examined the patient and explained the following risks and benefits of being transferred/refusing transfer to the patient/family:  General risk, such as traffic hazards, adverse weather conditions, rough terrain or turbulence, possible failure of equipment (including vehicle or aircraft), or consequences of actions of persons outside the control of the transport personnel, Unanticipated needs of medical equipment and personnel during transport, Risk of worsening condition, The possibility of a transport vehicle being unavailable      Based on these reasonable risks and benefits to the patient and/or the unborn child(suyapa), and based upon the information available at the time of the patients examination, I certify that the medical benefits reasonably to be expected from the provision of appropriate medical treatments at another medical facility outweigh the increasing risks, if any, to the individuals medical condition, and in the case of labor to the unborn child, from effecting the transfer      X____________________________________________ DATE 08/11/22        TIME_______      ORIGINAL - SEND TO MEDICAL RECORDS   COPY - SEND WITH PATIENT DURING TRANSFER

## 2022-08-12 NOTE — ASSESSMENT & PLAN NOTE
· Patient with a history of chronic daily alcohol use, H/o withdrawal seizures  · Last drink 2 days ago  · Serum ethanol <3 (8/11/2022 1851)  · Received Ativan 1 mg in the ED PTA  · SEWS protocol with symptom-triggered phenobarbital followed for medical management of alcohol withdrawal  · Patient received 260 mg total phenobarbital, last dose administered 8/12/2022 0556  · Currently presents with anxiety/agitation; otherwise, VSS, no tremors  · Currently A&Ox4, ambulating independently   · Despite education of importance of remaining in the hospital for ongoing withdrawal management, patient signed out AMA

## 2022-08-12 NOTE — CASE MANAGEMENT
Worker received phone call from pt's mother Kishor Gómez regarding pt, worker provided her with update  Pt's mother did report that pt does come from a long family hx of addicts including herself  Pt's mother is currently clean and did report that pt has been using drugs since he was a teenager  Pt's mother is supportive but pt cannot reside with her due to drug use and his 6year old sister lives with mom as well  Worker will continue to follow-up as needed

## 2022-08-12 NOTE — ASSESSMENT & PLAN NOTE
· Pt currently homeless  · Consult case management for assistance with aftercare resources - pt interested in inpatient rehab after d/c

## 2022-08-12 NOTE — CASE MANAGEMENT
Multiple attempts made to contact pt's motherRony  A message was left indicating pt was leaving against recommendation from staff and AMA   A call back number for unit was left

## 2022-08-12 NOTE — ASSESSMENT & PLAN NOTE
· Pt currently homeless  · Consult case management for assistance with aftercare resources - pt was interested in inpatient rehab upon d/c, however left AMA

## 2022-08-13 PROBLEM — E87.6 HYPOKALEMIA: Status: ACTIVE | Noted: 2022-08-13

## 2022-08-13 NOTE — ASSESSMENT & PLAN NOTE
· CMP this AM revealed K+ 3 1  · Mg 1 8  · Supplementation administered  · Continue to monitor- patient left AMA, provided with lab script for outpatient BMP for ongoing monitoring

## 2022-09-23 NOTE — UTILIZATION REVIEW
URGENT/EMERGENT  INPATIENT/SPU AUTHORIZATION REQUEST    Date: 09/23/22            # Pages in this Request:     X New Request   Additional Information for PA#:     Office Contact Name:  Austin Diane Title: Utilization Review, Ariel Nurse     Phone: [phone]  Ext  Availability (Date/Time): Wednesday - Friday 8 am- 4 pm    x Inpatient Review  SPU Review        Current       x Late Pick-up   · How your facility was first notified of the Late Pick-up: Paths Letter   · When your facility was first notified of the Late Pick-up (date): 9/21/2022         RECIPIENT INFORMATION    Recipient ID#: [id number]   Recipient Name: Martin Lee      YOB: 1995  32 y o  Recipient Alias:     Gender:  X Male  Female Medicaid Eligibility ([address] [place] [address]): INSURANCE INFORMATION    (All other private or governmental health insurance benefits must be utilized prior to billing the [place] Program)    Was this admission the result of an MVA, other accident, assault, injury, fall, gunshot, bite etc ? Yes x No                   If yes, provide a brief description of the incident  Does the recipient have other insurance coverage? Yes x No        Insurance Company Name/Policy #      Did that insurance pay on this claim? Yes  No        Did that insurance deny this claim? Yes  No    If yes, reason for denial:      Does the recipient have Medicare? Yes x No        Did Medicare exhaust prior to this admission? Yes  No        Did Medicare partially pay this claim? Yes  No        Did that insurance deny this claim? Yes  No    If yes, reason for denial:          Was the recipient a prisoner at the time of admission?   Yes x No            PROVIDER INFORMATION    Hospital [facility]  Yoana Ayers Provider ID#: [id number]    Admitting Physician Name:[facility]( Detox)  [employer] Provider ID#: [id number]        ADMISSION INFORMATION    Type of Admission: (please choose one)     ED     X Direct    If yes, from where? [facility] ED      Transfer    If yes, transferring hospital (inpatient, rehab, or psych) Provider Name/Provider ID#: Admission Floor or Unit Type: Med Surg     Dates/Times:        ED Date/Time:         Observation Date/Time:          Admission Date/Time: 8/11/22  9:45 PM        Discharge or Transfer Date/Time: 8/12/2022  4:06 PM        DIAGNOSIS/PROCEDURE CODES    Primary Diagnosis Code/Primary Diagnosis Code description:  F10 239 Alcohol dependence with withdrawal, unspecified   F11 23 Opioid dependence with withdrawal  F17 210 Nicotine dependence, cigarettes, uncomplicated   L82 6 Hypokalemia   Additional Diagnosis Code(s) and Description(s)-(up to three additional codes):    Procedure Code (one) and description:        CLINICAL INFORMATION - 2 Ashley Chang    Is there a prior admission with a discharge date within 30 days of the date of this admission? X No (Proceed to the next section - "Clinical Information - General Review Checklist:)      Yes (Provide the following information)     Prior admission dates:    MA Prior Authorization Number:        Review Outcome:     Diagnosis Code(s)/Description:    Procedure Code/Description:    Findings:    Treatment:    Condition on Discharge:   Vitals:    Labs:   Imaging:   Medications: Follow-up Instructions:    Disposition:        CLINICAL INFORMATION - GENERAL REVIEW CHECKLIST    EMERGENCY DEPARTMENT: (Proceed to "ADMISSION" if Direct Admission)    Presenting Signs/Symptoms:    Medication/treatment prior to arrival in the ED:    Past Medical History:    Clinical Exam:    Initial Vital Signs: (Temp, Pulse, Resp, and BP)     Pertinent Repeat Vital Signs: (include times they were obtained)    Pertinent Sustained Findings: (include times they were obtained)    Weight in Kilograms:  Pertinent Labs (results):    Radiology (results):    EKG (results):      Other tests (results):    Tests pending final results:    Treatment in the ED:  Other treatments:      Change in condition while in the ED:     Response to ED Treatment:          OBSERVATION: (Proceed to "ADMISSION" if Direct Admission)    Orders written during the observation period  Meds Name, dose, route, time, how may doses given:  PRN Meds Name, dose, route, time, how many doses given within the first 24 hrs :  IVs Type, rate, and total amt  ordered/given:  Labs, imaging, other:  Consults and findings:    Test Results during the observation period  Pertinent Lab tests (dates/results):  Culture results (blood, urine, spinal, wound, respiratory, etc ):  Imaging tests (dates/results):  EKG (dates/results): Other test (dates/results):  Tests pending (dates/results):    Surgical or Invasive Procedures during the observation period  Name of surgery/procedure:  Date & Time:  Patient Response:  Post-operative orders:  Operative Report/Findings:    Response to Treatment, Major Change in Condition, Major Charge in Treatment during the observation period          ADMISSION:    DIRECT Admissions Only:  32 y o  male who initially presented to [address] [place] [address], was then transferred by EMS to [address] [place] [address] as a direct admission to medical detox  Inpatient admission for evaluation and treatment of alcohol withdrawal & opioid withdrawal syndrome  PMHx: AUD  Presented w/ request for detox from alcohol & opioids  Reports 1 pint whiskey daily, last drink on 8/10  Reports 5-7 bags fentanyl use via smoking; last use 8/10  Has prior inpatient treatment for withdrawal  Notes prior use of Suboxone  Reports hx of withdrawal seizures  On exam, somnolent but arouses easily, tongue fasciculations, tremors, blunt and withdrawn  SEWS 0   Plan: SEWS monitoring w/ phenobarbital management, thiamine/folic acid supplement, IVF, telemetry, continuous pulse ox, continue home meds, trend labs, replete electrolytes as needed; Butrans patch         · Presenting Signs/Symptoms:   ·   · Medication/treatment prior to arrival:  ·   · Past Medical History:  ·   · Clinical Exam on admission:  ·   · Vital Signs on admission: (Temp, Pulse, Resp, and BP)  Date/Time Temp Pulse Resp BP MAP (mmHg) SpO2 O2 Device   08/12/22 1104 99 1 °F (37 3 °C) 75 18 135/77 96 98 % None (Room air)   08/12/22 0900 -- 72 -- 137/70 -- -- --   08/12/22 0710 99 °F (37 2 °C) 78 18 124/73 90 99 % None (Room air)   08/12/22 0547 98 5 °F (36 9 °C) 59 18 151/69 96 99 % None (Room air)   08/11/22 2158 98 8 °F (37 1 °C) 66 18 98/76 -- 97 % None (Room air)        ·   · Weight in kilograms:   08/11/22 57 2 kg (126 lb)       ALL Admissions:    Admission Orders and Other Orders written within the first 24 hrs after admission    Regular Diet  SCDs  Fall & Seizure Precautions  SEWS monitoring  Telemetry & Continuous Pulse Ox  Meds Name, dose, route, time, how may doses given:    enoxaparin, 40 mg, Subcutaneous, Daily  folic acid, 1 mg, Oral, Daily  multivitamin-minerals, 1 tablet, Oral, Daily  nicotine, 1 patch, Transdermal, Daily  thiamine, 100 mg, Oral, Daily  transdermal buprenorphine, 20 mcg, Transdermal, Q7 Days         PRN Meds Name, dose, route, time, how many doses given within the first 24 hrs :    acetaminophen, 650 mg, Oral, Q6H PRN  cloNIDine, 0 1 mg, Oral, Q6H PRN; 8/12 x1  gabapentin, 300 mg, Oral, Q8H PRN; 8/12 x1  ondansetron, 4 mg, Intravenous, Q6H PRN  phenobarbital, 260 mg, Intravenous; 8/12 x1  potassium chloride, 40 mEq, Oral; 8/12 x1  potassium chloride, 20 mEq, Intravenous; 8/12 x1  traZODone, 50 mg, Oral, HS PRN        IVs Type, rate, and total amt  Ordered/given:    sodium chloride, 100 mL/hr, Intravenous, Continuous      Labs, imaging, other:    08/12/22 0905 08/12/22 0800 08/12/22 0627 08/12/22 0548 08/12/22 0232 08/11/22 2220   Severity of Ethanol Withdrawal Scale (SEWS)     ANXIETY: Do you feel that something bad is about to happen to you right now?   0  -TD 3  -LL 0  -DE 3  -DE 0  -DE 0   NAUSEA and DRY HEAVES or VOMITING? 0  -TD 0  -LL 0  -DE 0  -DE 0  -DE 0   SWEATING: (includes moist palms, sweating now)? Score 0 or 2 0  -TD 0  -LL 0  -DE 2  -DE 0  -DE 0   TREMOR: with arms extended eyes closed? 2  -TD 0  -LL 0  -DE 2  -DE 0  -DE 0   AGITATION: Fidgety, restless, pacing?  0  -TD 0  -LL 0  -DE 0  -DE 0  -DE 0   DISORIENTATION: 0  -TD 0  -LL 0  -DE 0  -DE 0  -DE 0   HALLUCINATIONS: 0  -TD 0  -LL 0  -DE 0  -DE 0  -DE 0   VITAL SIGNS: ANY (Pulse >534, Diastolic BP >34, Temp >17 0) 0  -TD 0  -LL 0  -DE 0  -DE 0  -DE 0   SEWS Total Score 2  -TD 3  -LL 0  -DE 7  -DE 0  -DE 0            Consults and findings:    Test Results after admission  Pertinent Lab tests (dates/results):  Results from last 7 days   Lab Units 08/07/22  1657   SARS-COV-2   Negative            Results from last 7 days   Lab Units 08/12/22  0457 08/11/22  1851   WBC Thousand/uL 7 36 9 98   HEMOGLOBIN g/dL 12 9 14 7   HEMATOCRIT % 37 7 41 8   PLATELETS Thousands/uL 274 334   NEUTROS ABS Thousands/µL  --  7 91*                Results from last 7 days   Lab Units 08/12/22  0457 08/11/22  1851   SODIUM mmol/L 139 141   POTASSIUM mmol/L 3 1* 4 3   CHLORIDE mmol/L 111* 103   CO2 mmol/L 22 27   ANION GAP mmol/L 6 11   BUN mg/dL 7 11   CREATININE mg/dL 0 66* 0 77   EGFR ml/min/1 73sq m 133 125   CALCIUM mg/dL 8 4 9 4   MAGNESIUM mg/dL 1 8 2 3            Results from last 7 days   Lab Units 08/12/22  0457 08/11/22  1851   AST U/L 26 38   ALT U/L 24 34   ALK PHOS U/L 66 98   TOTAL PROTEIN g/dL 6 9 8 3   ALBUMIN g/dL 4 0 4 5   TOTAL BILIRUBIN mg/dL 0 56 0 60                Results from last 7 days   Lab Units 08/12/22  0457 08/11/22  1851   GLUCOSE RANDOM mg/dL 100* 109           Results from last 7 days   Lab Units 08/07/22  1657   INFLUENZA A PCR   Negative   INFLUENZA B PCR   Negative   RSV PCR   Negative               Results from last 7 days   Lab Units 08/07/22  3608   AMPH/METH   Negative   BARBITURATE UR   Negative   BENZODIAZEPINE UR   Negative   COCAINE UR   Negative METHADONE URINE   Negative   OPIATE UR   Negative   PCP UR   Negative   THC UR   Negative           Results from last 7 days   Lab Units 08/11/22  1851   ETHANOL LVL mg/dL <3   ACETAMINOPHEN LVL ug/mL <4 9*   SALICYLATE LVL mg/dL 4 4              Culture results (blood, urine, spinal, wound, respiratory, etc ):  Imaging tests (dates/results):  EKG (dates/results): Other test (dates/results):  Tests pending (dates/results):    Surgical or Invasive Procedures  Name of surgery/procedure:  Date & Time:  Patient Response:  Post-operative orders:  Operative Report/Findings:    Response to Treatment, Major Change in Condition, Major Charge in Treatment anytime during admission  Date: 08/12/22       Day 2: On exam, diaphoretic, tremors, anxious  SEWS 7  Plan: continue SEWS monitoring w/ phenobarbital management, thiamine/folic acid supplement, telemetry, continuous pulse ox, continue home meds, trend labs, replete electrolytes as needed, continue Butrans patch, symptomatic supportive care       Hospital Course:      Sybil Nair is a 32 y o  male patient PMH AUD with h/o withdrawal seizures, OUD, h/o IVDU who originally presented to the hospital on 8/11/2022 due to alcohol and opioid withdrawal  Patient originally presented to [facility] ED 8/11/2022 for alcohol and fentanyl detox  Received 1 mg ativan in ED PTA  Patient subsequently admitted to [facility] detox unit for medically-assisted alcohol withdrawal  SEWS protocol with symptom-triggered phenobarbital was followed during admission  Patient received 260 mg total phenobarbital, last dose administered 8/12/2022 around 6 am  For opioid withdrawal management, suboxone MAT protocol initiated on admission: 1 Butrans 20 mcg/hr patch applied on admission  Plan was to initiate micro-induction with suboxone later today or tomorrow, however patient demanded to leave       Patient reported to nursing that he needed to leave to smoke a cigarette   Upon evaluation, patient stated he wanted to go outside to smoke a cigarette and said he would "come right back " Patient was offered nicotine patch and nicorette gum, both of which he refused  Explained to patient that he could not leave the unit and come back due to hospital policy  Patient then stated he would leave and go back to the ED to try to come back to the unit, patient informed that he would not be accepted back if he left  Educated patient regarding importance of remaining in the hospital for ongoing treatment of both alcohol and fentanyl withdrawal  Patient's last dose of phenobarbital was around 6 am this morning (approx  10 hrs ago)  At time of departure, patient appears anxious/restless; VSS, no tremors, A&Ox4 and ambulating independently  Despite education of risk of worsening withdrawal symptoms if he were to leave, patient stated smoking cigarettes was "all he had left" and he signed out AMA  Patient informed of risks of leaving including worsening withdrawal symptoms and risk of overdose/death with further substance use  Patient expressed understanding and signed AMA form  Patient provided with narcan kit on discharge, nursing education provided for use      CM attempted to notify patient's mother via phone of patient leaving AMA, voicemail left      Detox attending on-call, Dr Cr Duffy, was notified of patient leaving AMA        Disposition on Discharge  Home, Rehab, SNF, LTC, Shelter, etc : Home/Self Care - left AMA     Cease to Breathe (CTB)  If a patient expires during an admission, in addition to the above information, please include:    Summary/timeline of the patient's decline in condition:    Medications and treatment:    Patient response to treatment:    Date and time patient ceased to breathe:        Is there a Readmission that follows this admission?    Yes x No    If yes, provide dates:          InterQual Review    InterQual Criteria Met: X Yes  No  N/A        Please include the InterQual Review, InterQual year/version used, and the criteria selected:   Created Using Review Status Review Entered   InterQual Connect In Primary 8/12/2022 11:41       Criteria Set Name - Subset   LOC:Acute Adult-Withdrawal Syndrome      Criteria Review   REVIEW SUMMARY     InterQual® Review Status: In Primary  Criteria Status: Acute Met  Day of review: Episode Day 1  Condition Specific: Yes        REVIEW DETAILS     Product: Marylene Skeeters Adult  Subset: Withdrawal Syndrome        (Symptom or finding within 24h)     (Excludes PO medications unless noted)         [X] Select Day, One:          [X] Episode Day 1, One:              [X] ACUTE, All:                  [X] Substance, >= One:                      [X] Alcohol withdrawal syndrome                  [X] Finding, >= One:                      [X] Myoclonic contractions or tremors                  [X] Intervention, >= One:                      [X] Rapid tranquilization        Version: [employer]® 2022, Apr 2022 Release  InterQual® criteria (IQ) is confidential and proprietary information and is being provided to you solely as it pertains to the information requested  IQ may contain advanced clinical knowledge which we recommend you discuss with your physician upon disclosure to you  Use permitted by and subject to license with [employer] and/or one of its [place]  IQ reflects clinical interpretations and analyses and cannot alone either (a) resolve medical ambiguities of particular situations; or (b) provide the sole basis for definitive decisions  IQ is intended solely for use as screening guidelines with respect to medical appropriateness of healthcare services  All ultimate care decisions are strictly and solely the obligation and responsibility of your health care provider  © 2022 [employer] and/or one of its subsidiaries  All Rights Reserved  CPT® only © 9448-5890 American Medical Association  All Rights Reserved                 PLEASE SUBMIT THE COMPLETED FORM TO THE DEPARTMENT OF HUMAN SERVICES - DIVISION OF CLINICAL  REVIEW VIA FAX -[phone] or VIA E-MAIL TO [email or web address]    Signature: Usman Bernstein Date:  09/23/22    Confidentiality Notice: The documents accompanying this telecopy may contain confidential information belonging to the sender  The information is intended only for the use of the individual named above  If you are not the intended recipient, you are hereby notified  That any disclosure, copying, distribution or taking of any telecopy is strictly prohibited

## 2022-12-05 ENCOUNTER — TELEPHONE (OUTPATIENT)
Dept: PSYCHIATRY | Facility: CLINIC | Age: 27
End: 2022-12-05

## 2023-09-09 ENCOUNTER — HOSPITAL ENCOUNTER (EMERGENCY)
Facility: HOSPITAL | Age: 28
Discharge: HOME | End: 2023-09-09
Attending: EMERGENCY MEDICINE | Admitting: EMERGENCY MEDICINE

## 2023-09-09 VITALS
DIASTOLIC BLOOD PRESSURE: 73 MMHG | TEMPERATURE: 98.2 F | RESPIRATION RATE: 20 BRPM | BODY MASS INDEX: 21.69 KG/M2 | WEIGHT: 135 LBS | SYSTOLIC BLOOD PRESSURE: 123 MMHG | HEIGHT: 66 IN | HEART RATE: 78 BPM | OXYGEN SATURATION: 98 %

## 2023-09-09 DIAGNOSIS — F19.10 SUBSTANCE ABUSE (HCC): Primary | ICD-10-CM

## 2023-09-09 DIAGNOSIS — E87.6 HYPOKALEMIA: ICD-10-CM

## 2023-09-09 LAB
ALBUMIN SERPL BCP-MCNC: 4.1 G/DL (ref 3.5–5)
ALP SERPL-CCNC: 58 U/L (ref 34–104)
ALT SERPL W P-5'-P-CCNC: 14 U/L (ref 7–52)
AMPHETAMINES SERPL QL SCN: POSITIVE
ANION GAP SERPL CALCULATED.3IONS-SCNC: 3 MMOL/L
AST SERPL W P-5'-P-CCNC: 17 U/L (ref 13–39)
BARBITURATES UR QL: NEGATIVE
BASOPHILS # BLD AUTO: 0.06 THOUSANDS/ÂΜL (ref 0–0.1)
BASOPHILS NFR BLD AUTO: 1 % (ref 0–1)
BENZODIAZ UR QL: NEGATIVE
BILIRUB SERPL-MCNC: 0.41 MG/DL (ref 0.2–1)
BUN SERPL-MCNC: 10 MG/DL (ref 5–25)
CALCIUM SERPL-MCNC: 9.2 MG/DL (ref 8.4–10.2)
CHLORIDE SERPL-SCNC: 102 MMOL/L (ref 96–108)
CO2 SERPL-SCNC: 31 MMOL/L (ref 21–32)
COCAINE UR QL: NEGATIVE
CREAT SERPL-MCNC: 0.75 MG/DL (ref 0.6–1.3)
EOSINOPHIL # BLD AUTO: 0.27 THOUSAND/ÂΜL (ref 0–0.61)
EOSINOPHIL NFR BLD AUTO: 4 % (ref 0–6)
ERYTHROCYTE [DISTWIDTH] IN BLOOD BY AUTOMATED COUNT: 12.8 % (ref 11.6–15.1)
ETHANOL EXG-MCNC: 0 MG/DL
ETHANOL SERPL-MCNC: <10 MG/DL
GFR SERPL CREATININE-BSD FRML MDRD: 125 ML/MIN/1.73SQ M
GLUCOSE SERPL-MCNC: 133 MG/DL (ref 65–140)
HCT VFR BLD AUTO: 35 % (ref 36.5–49.3)
HGB BLD-MCNC: 11.9 G/DL (ref 12–17)
IMM GRANULOCYTES # BLD AUTO: 0.02 THOUSAND/UL (ref 0–0.2)
IMM GRANULOCYTES NFR BLD AUTO: 0 % (ref 0–2)
LYMPHOCYTES # BLD AUTO: 1.65 THOUSANDS/ÂΜL (ref 0.6–4.47)
LYMPHOCYTES NFR BLD AUTO: 23 % (ref 14–44)
MCH RBC QN AUTO: 29.2 PG (ref 26.8–34.3)
MCHC RBC AUTO-ENTMCNC: 34 G/DL (ref 31.4–37.4)
MCV RBC AUTO: 86 FL (ref 82–98)
MONOCYTES # BLD AUTO: 0.62 THOUSAND/ÂΜL (ref 0.17–1.22)
MONOCYTES NFR BLD AUTO: 9 % (ref 4–12)
NEUTROPHILS # BLD AUTO: 4.45 THOUSANDS/ÂΜL (ref 1.85–7.62)
NEUTS SEG NFR BLD AUTO: 63 % (ref 43–75)
NRBC BLD AUTO-RTO: 0 /100 WBCS
OPIATES UR QL SCN: NEGATIVE
OXYCODONE+OXYMORPHONE UR QL SCN: NEGATIVE
PCP UR QL: NEGATIVE
PLATELET # BLD AUTO: 268 THOUSANDS/UL (ref 149–390)
PMV BLD AUTO: 8.9 FL (ref 8.9–12.7)
POTASSIUM SERPL-SCNC: 3.1 MMOL/L (ref 3.5–5.3)
PROT SERPL-MCNC: 6.5 G/DL (ref 6.4–8.4)
RBC # BLD AUTO: 4.07 MILLION/UL (ref 3.88–5.62)
SODIUM SERPL-SCNC: 136 MMOL/L (ref 135–147)
THC UR QL: NEGATIVE
WBC # BLD AUTO: 7.07 THOUSAND/UL (ref 4.31–10.16)

## 2023-09-09 PROCEDURE — 80053 COMPREHEN METABOLIC PANEL: CPT

## 2023-09-09 PROCEDURE — 99285 EMERGENCY DEPT VISIT HI MDM: CPT

## 2023-09-09 PROCEDURE — 85025 COMPLETE CBC W/AUTO DIFF WBC: CPT

## 2023-09-09 PROCEDURE — 82075 ASSAY OF BREATH ETHANOL: CPT

## 2023-09-09 PROCEDURE — 99284 EMERGENCY DEPT VISIT MOD MDM: CPT

## 2023-09-09 PROCEDURE — 80307 DRUG TEST PRSMV CHEM ANLYZR: CPT

## 2023-09-09 PROCEDURE — 36415 COLL VENOUS BLD VENIPUNCTURE: CPT

## 2023-09-09 PROCEDURE — 82077 ASSAY SPEC XCP UR&BREATH IA: CPT

## 2023-09-09 PROCEDURE — 96360 HYDRATION IV INFUSION INIT: CPT

## 2023-09-09 PROCEDURE — 93005 ELECTROCARDIOGRAM TRACING: CPT

## 2023-09-09 RX ORDER — POTASSIUM CHLORIDE 20 MEQ/1
40 TABLET, EXTENDED RELEASE ORAL ONCE
Status: COMPLETED | OUTPATIENT
Start: 2023-09-09 | End: 2023-09-09

## 2023-09-09 RX ADMIN — SODIUM CHLORIDE 1000 ML: 0.9 INJECTION, SOLUTION INTRAVENOUS at 19:33

## 2023-09-09 RX ADMIN — POTASSIUM CHLORIDE 40 MEQ: 1500 TABLET, EXTENDED RELEASE ORAL at 20:18

## 2023-09-09 NOTE — ED PROVIDER NOTES
History  Chief Complaint   Patient presents with   • Addiction Problem     Pt states "I relapsed on fentanyl and tranquilizer and its gotten real bad. I would like to go to detox" Reports last using around 1pm.      This is a 32 YOM with a medical history significant for alcohol abuse who presents to the ED requesting detox from fentanyl. Patient reports that he has been in and out of rehab multiple times, states that approximately 2 months prior he relapsed on fentanyl. Reports that he uses been smoking approximately 3 to 4 bags daily fentanyl and tranquilizer. Reports that his last use was approximately 1 PM today, he presents to the ED requesting rehab. Patient does report history of alcohol abuse but states he has not consumed any alcohol in the last several months. He denies any other acute concerns or complaints at this time including lightheadedness, vision changes, diaphoresis, chest pain, SOB, abdominal pain, nausea, vomiting, diarrhea or stool changes. He reports that he would like to quit fentanyl but is not interested in being started on Suboxone or another maintenance drug. Prior to Admission Medications   Prescriptions Last Dose Informant Patient Reported? Taking?   folic acid (FOLVITE) 1 mg tablet   No No   Sig: Take 1 tablet (1 mg total) by mouth daily   thiamine 100 MG tablet   No No   Sig: Take 1 tablet (100 mg total) by mouth daily      Facility-Administered Medications: None       Past Medical History:   Diagnosis Date   • Alcohol abuse        History reviewed. No pertinent surgical history. Family History   Problem Relation Age of Onset   • Hypertension Mother    • Hypertension Father      I have reviewed and agree with the history as documented.     E-Cigarette/Vaping   • E-Cigarette Use Never User      E-Cigarette/Vaping Substances   • Nicotine No    • Flavoring No      Social History     Tobacco Use   • Smoking status: Every Day     Packs/day: 1.00     Types: Cigarettes   • Smokeless tobacco: Never   Vaping Use   • Vaping Use: Never used   Substance Use Topics   • Alcohol use: Yes     Comment: patient reports pint of whiskey daily. • Drug use: Yes     Types: Fentanyl       Review of Systems   Constitutional: Negative for chills and fever. Eyes: Negative for photophobia and visual disturbance. Respiratory: Negative for cough and shortness of breath. Cardiovascular: Negative for chest pain and palpitations. Gastrointestinal: Negative for abdominal pain, diarrhea, nausea and vomiting. Genitourinary: Negative for difficulty urinating and dysuria. Musculoskeletal: Negative for arthralgias, neck pain and neck stiffness. Neurological: Negative for dizziness, seizures, syncope, light-headedness and headaches. Physical Exam  Physical Exam  Vitals and nursing note reviewed. Constitutional:       General: He is not in acute distress. Appearance: He is well-developed. He is not ill-appearing or diaphoretic. HENT:      Head: Normocephalic and atraumatic. Mouth/Throat:      Pharynx: Oropharynx is clear. Eyes:      Conjunctiva/sclera: Conjunctivae normal.   Cardiovascular:      Rate and Rhythm: Normal rate and regular rhythm. Heart sounds: No murmur heard. Pulmonary:      Effort: Pulmonary effort is normal. No respiratory distress. Breath sounds: Normal breath sounds. Abdominal:      Palpations: Abdomen is soft. Tenderness: There is no abdominal tenderness. Musculoskeletal:         General: No swelling. Cervical back: Normal range of motion and neck supple. Skin:     General: Skin is warm and dry. Capillary Refill: Capillary refill takes less than 2 seconds. Neurological:      General: No focal deficit present. Mental Status: He is alert and oriented to person, place, and time.    Psychiatric:         Mood and Affect: Mood normal.         Vital Signs  ED Triage Vitals [09/09/23 1853]   Temperature Pulse Respirations Blood Pressure SpO2   98.2 °F (36.8 °C) 78 20 123/73 98 %      Temp Source Heart Rate Source Patient Position - Orthostatic VS BP Location FiO2 (%)   Temporal Monitor Sitting Left arm --      Pain Score       No Pain           Vitals:    09/09/23 1853 09/09/23 2000   BP: 123/73 123/73   Pulse: 78 78   Patient Position - Orthostatic VS: Sitting          Visual Acuity      ED Medications  Medications   sodium chloride 0.9 % bolus 1,000 mL (0 mL Intravenous Stopped 9/9/23 2043)   potassium chloride (K-DUR,KLOR-CON) CR tablet 40 mEq (40 mEq Oral Given 9/9/23 2018)       Diagnostic Studies  Results Reviewed     Procedure Component Value Units Date/Time    Rapid drug screen, urine [[id number]]  (Abnormal) Collected: 09/09/23 2043    Lab Status: Final result Specimen: Urine, Clean Catch Updated: 09/09/23 2107     Amph/Meth UR Positive     Barbiturate Ur Negative     Benzodiazepine Urine Negative     Cocaine Urine Negative     Methadone Urine --     Opiate Urine Negative     PCP Ur Negative     THC Urine Negative     Oxycodone Urine Negative    Narrative:      No methadone test performed; if required call laboratory  [address] IF CONFIRMATION NEEDED PLEASE CONTACT THE LAB WITHIN 5 DAYS.     Drug Screen Cutoff Levels:  AMPHETAMINE/METHAMPHETAMINES  1000 ng/mL  BARBITURATES     200 ng/mL  BENZODIAZEPINES     200 ng/mL  COCAINE      300 ng/mL  METHADONE      300 ng/mL  OPIATES      300 ng/mL  PHENCYCLIDINE     25 ng/mL  THC       50 ng/mL  OXYCODONE      100 ng/mL    Ethanol [[id number]]  (Normal) Collected: 09/09/23 1933    Lab Status: Final result Specimen: Blood from Arm, Right Updated: 09/09/23 1958     Ethanol Lvl <10 mg/dL     Comprehensive metabolic panel [[id number]]  (Abnormal) Collected: 09/09/23 1933    Lab Status: Final result Specimen: Blood from Arm, Right Updated: 09/09/23 1958     Sodium 136 mmol/L      Potassium 3.1 mmol/L      Chloride 102 mmol/L      CO2 31 mmol/L      ANION GAP 3 mmol/L      BUN 10 mg/dL      Creatinine 0.75 mg/dL      Glucose 133 mg/dL      Calcium 9.2 mg/dL      AST 17 U/L      ALT 14 U/L      Alkaline Phosphatase 58 U/L      Total Protein 6.5 g/dL      Albumin 4.1 g/dL      Total Bilirubin 0.41 mg/dL      eGFR 125 ml/min/1.73sq m     Narrative:      [facility] guidelines for Chronic Kidney Disease (CKD):   •  Stage 1 with normal or high GFR (GFR > 90 mL/min/1.73 square meters)  •  Stage 2 Mild CKD (GFR = 60-89 mL/min/1.73 square meters)  •  Stage 3A Moderate CKD (GFR = 45-59 mL/min/1.73 square meters)  •  Stage 3B Moderate CKD (GFR = 30-44 mL/min/1.73 square meters)  •  Stage 4 Severe CKD (GFR = 15-29 mL/min/1.73 square meters)  •  Stage 5 End Stage CKD (GFR <15 mL/min/1.73 square meters)  Note: GFR calculation is accurate only with a steady state creatinine    POCT alcohol breath test [[id number]]  (Normal) Resulted: 09/09/23 1950    Lab Status: Final result Updated: 09/09/23 1950     EXTBreath Alcohol 0.000    CBC and differential [[id number]]  (Abnormal) Collected: 09/09/23 1933    Lab Status: Final result Specimen: Blood from Arm, Right Updated: 09/09/23 1940     WBC 7.07 Thousand/uL      RBC 4.07 Million/uL      Hemoglobin 11.9 g/dL      Hematocrit 35.0 %      MCV 86 fL      MCH 29.2 pg      MCHC 34.0 g/dL      RDW 12.8 %      MPV 8.9 fL      Platelets 775 Thousands/uL      nRBC 0 /100 WBCs      Neutrophils Relative 63 %      Immat GRANS % 0 %      Lymphocytes Relative 23 %      Monocytes Relative 9 %      Eosinophils Relative 4 %      Basophils Relative 1 %      Neutrophils Absolute 4.45 Thousands/µL      Immature Grans Absolute 0.02 Thousand/uL      Lymphocytes Absolute 1.65 Thousands/µL      Monocytes Absolute 0.62 Thousand/µL      Eosinophils Absolute 0.27 Thousand/µL      Basophils Absolute 0.06 Thousands/µL                  No orders to display              Procedures  ECG 12 Lead Documentation Only    Date/Time: 9/9/2023 7:37 PM    Performed by: Asif Cruz Jatin Flower PA-C  Authorized by: [address] PA-C    Rate:     ECG rate:  83    ECG rate assessment: normal    Rhythm:     Rhythm: sinus rhythm    Ectopy:     Ectopy: none    QRS:     QRS axis:  Normal  Conduction:     Conduction: normal    ST segments:     ST segments:  Normal  T waves:     T waves: non-specific               ED Course  ED Course as of 09/09/23 2208   Sat Sep 09, 2023   [address] Rexjan[address] Claudio contacted. 1959 Potassium(!): 3.1  Will replete with oral K.            Clinical Opiate Withdrawal Scale     Row Name 09/09/23 2000                Pulse --        Resting Pulse Rate: Measured After Patient is Sitting or Lying for One Minute 0  -SW        GI Upset: Over Last Half Hour 0  -SW        Sweating: Over Past Half Hour Not Accounted for by Room Temperature of Patient Activity 0  -SW        Tremor: Observation of Outstretched Hands 0  -SW        Restlessness: Observation During Assessment 0  -SW        Yawning: Observation During Assessment 0  -SW        Pupil Size 0  -SW        Anxiety and Irritability 0  -SW        Bone or Joint Aches: If Patient was Having Pain Previously, Only the Additional Component Attributed to Opiate Withdrawal is Scored 0  -SW        Gooseflesh Skin 3  -SW        Runny Nose or Tearing: Not Accounted for by Cold Symptoms or Allergies 0  -SW        Clinical Opiate Withdrawal Scale Total Score 3  -SW        Heart Rate Source --        Patient Position - Orthostatic VS --              User Key  (r) = Recorded By, (t) = Taken By, (c) = Cosigned By    Initials Name    Reg Valdez RN                                              Medical Decision Making  49-year-old male presents to the ED with fentanyl abuse, requesting rehab, denies SI, HI or thoughts to harm himself or others. Patient with normal vital signs in ED, well-appearing on exam.  CBC unremarkable, CMP was concerning for mild hypokalemia 3.1, potassium was repleted in ED, CMP otherwise acutely unremarkable.   [facility] contacted on patient's behalf for placement for rehab. Amount and/or Complexity of Data Reviewed  Labs: ordered. Decision-making details documented in ED Course. Risk  Prescription drug management. Disposition  Final diagnoses:   Substance abuse ([address])   Hypokalemia     Time reflects when diagnosis was documented in both MDM as applicable and the Disposition within this note     Time User Action Codes Description Comment    9/9/2023 10:00 PM Jose Luis Cortez Add [F19.10] Substance abuse ([address])     9/9/2023 10:06 PM Jose Luis Cortez Add [E87.6] Hypokalemia       ED Disposition     ED Disposition   Discharge    Condition   Stable    Date/Time   Sat Sep 9, 2023 10:01 PM    Comment   Orcharly Cedeno discharge to home/self care. Follow-up Information     Follow up With Specialties Details Why Contact Info Additional [address] [facility] Emergency Department Emergency Medicine Go to  If symptoms worsen [address] [facility] [phone]  [address] [facility] Emergency Department, [address] [facility], [address] [facility]          Patient's Medications   Discharge Prescriptions    No medications on file       No discharge procedures on file.     PDMP Review       Value Time User    PDMP Reviewed  Yes 8/12/2022  1:11 AM Valerie Mendoza PA-C          ED Provider  Electronically Signed by           Jose Luis Cortez PA-C  09/09/23 4483

## 2023-09-10 LAB
ATRIAL RATE: 83 BPM
P AXIS: 74 DEGREES
PR INTERVAL: 144 MS
QRS AXIS: 91 DEGREES
QRSD INTERVAL: 88 MS
QT INTERVAL: 390 MS
QTC INTERVAL: 458 MS
T WAVE AXIS: 39 DEGREES
VENTRICULAR RATE: 83 BPM

## 2023-09-10 PROCEDURE — 93010 ELECTROCARDIOGRAM REPORT: CPT | Performed by: INTERNAL MEDICINE

## 2023-09-10 NOTE — DISCHARGE INSTRUCTIONS
Follow-up with [address] [place] [address] for further evaluation and management and possible placement for rehab. Return to the ED if you develop any worsening symptoms.

## 2024-10-23 ENCOUNTER — HOSPITAL ENCOUNTER (EMERGENCY)
Facility: HOSPITAL | Age: 29
Discharge: ANOTHER HOSPITAL | End: 2024-10-23
Attending: EMERGENCY MEDICINE
Payer: COMMERCIAL

## 2024-10-23 VITALS
SYSTOLIC BLOOD PRESSURE: 143 MMHG | TEMPERATURE: 97.9 F | OXYGEN SATURATION: 97 % | HEART RATE: 87 BPM | WEIGHT: 140 LBS | DIASTOLIC BLOOD PRESSURE: 88 MMHG | RESPIRATION RATE: 19 BRPM | BODY MASS INDEX: 22.5 KG/M2 | HEIGHT: 66 IN

## 2024-10-23 DIAGNOSIS — F19.10 DRUG ABUSE (HCC): Primary | ICD-10-CM

## 2024-10-23 DIAGNOSIS — F10.10 ALCOHOL ABUSE: ICD-10-CM

## 2024-10-23 DIAGNOSIS — Z59.00 HOMELESS: ICD-10-CM

## 2024-10-23 LAB
AMPHETAMINES SERPL QL SCN: POSITIVE
ANION GAP SERPL CALCULATED.3IONS-SCNC: 5 MMOL/L (ref 4–13)
ATRIAL RATE: 80 BPM
BARBITURATES UR QL: NEGATIVE
BASOPHILS # BLD AUTO: 0.06 THOUSANDS/ΜL (ref 0–0.1)
BASOPHILS NFR BLD AUTO: 1 % (ref 0–1)
BENZODIAZ UR QL: NEGATIVE
BUN SERPL-MCNC: 12 MG/DL (ref 5–25)
CALCIUM SERPL-MCNC: 9.3 MG/DL (ref 8.4–10.2)
CHLORIDE SERPL-SCNC: 104 MMOL/L (ref 96–108)
CO2 SERPL-SCNC: 30 MMOL/L (ref 21–32)
COCAINE UR QL: NEGATIVE
CREAT SERPL-MCNC: 0.68 MG/DL (ref 0.6–1.3)
EOSINOPHIL # BLD AUTO: 0.17 THOUSAND/ΜL (ref 0–0.61)
EOSINOPHIL NFR BLD AUTO: 3 % (ref 0–6)
ERYTHROCYTE [DISTWIDTH] IN BLOOD BY AUTOMATED COUNT: 12.5 % (ref 11.6–15.1)
ETHANOL SERPL-MCNC: <10 MG/DL
FENTANYL UR QL SCN: POSITIVE
GFR SERPL CREATININE-BSD FRML MDRD: 129 ML/MIN/1.73SQ M
GLUCOSE SERPL-MCNC: 92 MG/DL (ref 65–140)
HCT VFR BLD AUTO: 41.4 % (ref 36.5–49.3)
HGB BLD-MCNC: 13.9 G/DL (ref 12–17)
HYDROCODONE UR QL SCN: NEGATIVE
IMM GRANULOCYTES # BLD AUTO: 0.04 THOUSAND/UL (ref 0–0.2)
IMM GRANULOCYTES NFR BLD AUTO: 1 % (ref 0–2)
LYMPHOCYTES # BLD AUTO: 1.63 THOUSANDS/ΜL (ref 0.6–4.47)
LYMPHOCYTES NFR BLD AUTO: 24 % (ref 14–44)
MCH RBC QN AUTO: 29.7 PG (ref 26.8–34.3)
MCHC RBC AUTO-ENTMCNC: 33.6 G/DL (ref 31.4–37.4)
MCV RBC AUTO: 89 FL (ref 82–98)
METHADONE UR QL: NEGATIVE
MONOCYTES # BLD AUTO: 0.65 THOUSAND/ΜL (ref 0.17–1.22)
MONOCYTES NFR BLD AUTO: 10 % (ref 4–12)
NEUTROPHILS # BLD AUTO: 4.27 THOUSANDS/ΜL (ref 1.85–7.62)
NEUTS SEG NFR BLD AUTO: 61 % (ref 43–75)
NRBC BLD AUTO-RTO: 0 /100 WBCS
OPIATES UR QL SCN: NEGATIVE
OXYCODONE+OXYMORPHONE UR QL SCN: NEGATIVE
P AXIS: 64 DEGREES
PCP UR QL: NEGATIVE
PLATELET # BLD AUTO: 353 THOUSANDS/UL (ref 149–390)
PMV BLD AUTO: 8.6 FL (ref 8.9–12.7)
POTASSIUM SERPL-SCNC: 3.8 MMOL/L (ref 3.5–5.3)
PR INTERVAL: 128 MS
QRS AXIS: 93 DEGREES
QRSD INTERVAL: 84 MS
QT INTERVAL: 380 MS
QTC INTERVAL: 438 MS
RBC # BLD AUTO: 4.68 MILLION/UL (ref 3.88–5.62)
SODIUM SERPL-SCNC: 139 MMOL/L (ref 135–147)
T WAVE AXIS: 73 DEGREES
THC UR QL: NEGATIVE
VENTRICULAR RATE: 80 BPM
WBC # BLD AUTO: 6.82 THOUSAND/UL (ref 4.31–10.16)

## 2024-10-23 PROCEDURE — 36415 COLL VENOUS BLD VENIPUNCTURE: CPT

## 2024-10-23 PROCEDURE — 85025 COMPLETE CBC W/AUTO DIFF WBC: CPT

## 2024-10-23 PROCEDURE — 99283 EMERGENCY DEPT VISIT LOW MDM: CPT

## 2024-10-23 PROCEDURE — 82077 ASSAY SPEC XCP UR&BREATH IA: CPT

## 2024-10-23 PROCEDURE — 80048 BASIC METABOLIC PNL TOTAL CA: CPT

## 2024-10-23 PROCEDURE — 80307 DRUG TEST PRSMV CHEM ANLYZR: CPT

## 2024-10-23 PROCEDURE — 93010 ELECTROCARDIOGRAM REPORT: CPT | Performed by: INTERNAL MEDICINE

## 2024-10-23 PROCEDURE — 93005 ELECTROCARDIOGRAM TRACING: CPT

## 2024-10-23 PROCEDURE — 99285 EMERGENCY DEPT VISIT HI MDM: CPT

## 2024-10-23 SDOH — ECONOMIC STABILITY - HOUSING INSECURITY: HOMELESSNESS UNSPECIFIED: Z59.00

## 2024-10-23 NOTE — ED PROVIDER NOTES
Time reflects when diagnosis was documented in both MDM as applicable and the Disposition within this note       Time User Action Codes Description Comment    10/23/2024  1:55 PM Lidia Garces Add [F19.10] Drug abuse (HCC)     10/23/2024  1:55 PM Lidia Garces Add [F10.10] Alcohol abuse     10/23/2024  1:55 PM Lidia Garces Add [Z59.00] Homeless           ED Disposition       ED Disposition   Discharge    Condition   Stable    Date/Time   Wed Oct 23, 2024  1:55 PM    Comment   Paul Phelps discharge to home/self care.                   Assessment & Plan       Medical Decision Making  Differential diagnosis including electrolyte abnormality, alcohol intoxication, drug abuse  Patient is a 28-year-old male arriving requesting detox for fentanyl abuse, and alcohol abuse after he was dropped off here with his mother (who is requesting the same) by an [employer] .  Patient answering questions appropriately although appears tired.  Patient maintaining own airway, breathing 12-14 times per minute, no tachycardia, no hypertension.  Patient reports that last use of fentanyl and alcohol was yesterday at 2 PM. Patient has no chest pain, abdominal pain, shortness of breath, nausea, vomiting, acting appropriately.  Fentanyl use: Patient reports that he uses fentanyl daily, uses 3 bags a day.  Last use was yesterday 2 PM  Alcohol use: Patient reports that he has a history of withdrawal seizures, shakes, and becoming sweaty.  Patient denies history of hallucinations.  Patient reports last use was yesterday 2 PM.  Patient states that he drinks a pint of vodka a day.  CIWA 0 upon arrival to the ED.  Social: Patient is currently without a job, and is currently homeless.  Patient denies SI/HI.  Patient denies auditory visual hallucinations  Discussed with Dr. Yancey of toxicology that patient reports withdrawal seizures but his alcohol today is 0 and last alcohol intake was yesterday at 2 PM.  Patient is appropriate for  "outpatient rehab/detox per our discussion.   Patient requesting and inpatient detox/rehab unit, specifically stating, \"[facility].\" Patient declines going to [place].  Patient medically cleared to speak with TEJINDER. [facility] contacted and reports they will come to ED.    Mitesh jamie Bower came to bedside and assessed the patient.  Discussed patient with outpatient detox/rehab and is going to [facility].  Due to fentanyl use outpatient community narcan provided. Patient ambulatory out of department. Patient monitored throughout eval and has had no episodes of depression, respiratory arrest, hypoxia, difficulty breathing, and is always been easy to awaken. Patient has had no episodes of hypertension, tremors, seizures, hallucinations, tachycardia.   Reviewed reasons to return to ed.  Patient verbalized understanding of diagnosis and agreement with discharge plan of care as well as understanding of reasons to return to ed.        Amount and/or Complexity of Data Reviewed  Labs: ordered. Decision-making details documented in ED Course.    Risk  Prescription drug management.        ED Course as of 10/23/24 1651   Wed Oct 23, 2024   0940 CBC and differential(!)  No leukocytosis.    0952 ETHANOL: <10   1153 TEJINDER in dept for patient.    1211 Patient has  at 2:30 for rehab.    1320 Patient is easily awoken but continues to not answer medical staff, and closes his eyes purposefully. Discussed that there is concern he needs narcan, and states in a clear voice with eyes open, \"YOU WOULD NARCAN SOMEONE WHO DOESN'T NEED IT?\" Discussed that when in the health care environment he needs to be an active participant or given his history it may be perceived he is under the influence of narcotics and need reversal for his safety. Patient now awake, and eating lunch. Patient states that he hasn't slept in two days.    1430 Patient remains easily awoken.        Medications   naloxone nasal- Given to patient by provider at discharge. " (NARCAN) 4 mg/0.1 mL nasal spray 4 mg (0 mg Does not apply Hold 10/23/24 1500)       ED Risk Strat Scores                CIWA-Ar Score       Row Name 10/23/24 1000             CIWA-Ar    Nausea and Vomiting 0      Tactile Disturbances 0      Tremor 0      Auditory Disturbances 0      Paroxysmal Sweats 0      Visual Disturbances 0      Anxiety 0      Headache, Fullness in Head 0      Agitation 0      Orientation and Clouding of Sensorium 0      CIWA-Ar Total 0                              SBIRT 20yo+      Flowsheet Row Most Recent Value   Initial Alcohol Screen: US AUDIT-C     1. How often do you have a drink containing alcohol? 6 Filed at: 10/23/2024 0938   2. How many drinks containing alcohol do you have on a typical day you are drinking?  6 Filed at: 10/23/2024 0938   3a. Male UNDER 65: How often do you have five or more drinks on one occasion? 6 Filed at: 10/23/2024 0938   3b. FEMALE Any Age, or MALE 65+: How often do you have 4 or more drinks on one occassion? 0 Filed at: 10/23/2024 0938   Audit-C Score 18 Filed at: 10/23/2024 0938   Full Alcohol Screen: US AUDIT    4. How often during the last year have you found that you were not able to stop drinking once you had started? 4 Filed at: 10/23/2024 0938   5. How often during past year have you failed to do what was normally expected of you because of drinking?  4 Filed at: 10/23/2024 0938   6. How often in past year have you needed a first drink in the morning to get yourself going after a heavy drinking session?  4 Filed at: 10/23/2024 0938   7. How often in past year have you had feeling of guilt or remorse after drinking?  4 Filed at: 10/23/2024 0938   8. How often in past year have you been unable to remember what happened night before because you had been drinking?  4 Filed at: 10/23/2024 0938   9. Have you or someone else been injured as a result of your drinking?  0 Filed at: 10/23/2024 0938   10. Has a relative, friend, doctor or other health worker been  "concerned about your drinking and suggested you cut down?  4 Filed at: 10/23/2024 0938   AUDIT Total Score 42 Filed at: 10/23/2024 0938   KIRK: How many times in the past year have you...    Used an illegal drug or used a prescription medication for non-medical reasons? Daily or Almost Daily Filed at: 10/23/2024 0938   DAST-10: In the past 12 months...    1. Have you used drugs other than those required for medical reasons? 1 Filed at: 10/23/2024 0938   2. Do you use more than one drug at a time? 1 Filed at: 10/23/2024 0938   3. Have you had medical problems as a result of your drug use (e.g., memory loss, hepatitis, convulsions, bleeding, etc.)? 1 Filed at: 10/23/2024 0938   4. Have you had \"blackouts\" or \"flashbacks\" as a result of drug use?YesNo 1 Filed at: 10/23/2024 0938   5. Do you ever feel bad or guilty about your drug use? 1 Filed at: 10/23/2024 0938   6. Does your spouse (or parent) ever complain about your involvement with drugs? 0 Filed at: 10/23/2024 0938   7. Have you neglected your family because of your use of drugs? 0 Filed at: 10/23/2024 0938   8. Have you engaged in illegal activities in order to obtain drugs? 0 Filed at: 10/23/2024 0938   9. Have you ever experienced withdrawal symptoms (felt sick) when you stopped taking drugs? 1 Filed at: 10/23/2024 0938   10. Are you always able to stop using drugs when you want to? 1 Filed at: 10/23/2024 0938   DAST-10 Score 7 Filed at: 10/23/2024 0938                            History of Present Illness       Chief Complaint   Patient presents with    Drug Problem     Pt requesting detox for Fentanyl use. Pt reports that he last used yesterday.        Past Medical History:   Diagnosis Date    Alcohol abuse       History reviewed. No pertinent surgical history.   Family History   Problem Relation Age of Onset    Hypertension Mother     Hypertension Father       Social History     Tobacco Use    Smoking status: Every Day     Current packs/day: 1.00     Types: " Cigarettes    Smokeless tobacco: Never   Vaping Use    Vaping status: Never Used   Substance Use Topics    Alcohol use: Yes     Comment: patient reports pint of whiskey daily.    Drug use: Yes     Types: Fentanyl      E-Cigarette/Vaping    E-Cigarette Use Never User       E-Cigarette/Vaping Substances    Nicotine No     Flavoring No       I have reviewed and agree with the history as documented.     Patient is a 28-year-old male arriving for evaluation of alcohol and fentanyl abuse.  Patient reports that he drinks daily up to a pint.  Patient reports that he has had prior alcohol withdrawal with a seizure, getting sweaty, shakes.  Patient denies having hallucinations in the past with alcohol withdrawal.  Patient states last alcohol use was yesterday at 2 PM.  Patient states that he has been abusing fentanyl, using 3 bags a day.  Last use of fentanyl use was yesterday at 2 PM.  Patient appears tired, answering questions appropriately, easily arousable.  Patient denies any SI/HI.          Review of Systems   Constitutional: Negative.    HENT: Negative.     Eyes: Negative.    Respiratory: Negative.     Cardiovascular: Negative.    Gastrointestinal: Negative.    Endocrine: Negative.    Genitourinary: Negative.    Musculoskeletal: Negative.    Skin: Negative.    Allergic/Immunologic: Negative.    Neurological: Negative.    Hematological: Negative.    Psychiatric/Behavioral: Negative.  Negative for agitation, behavioral problems, dysphoric mood, hallucinations, self-injury, sleep disturbance and suicidal ideas. The patient is not hyperactive.    All other systems reviewed and are negative.          Objective       ED Triage Vitals   Temperature Pulse Blood Pressure Respirations SpO2 Patient Position - Orthostatic VS   10/23/24 0851 10/23/24 0851 10/23/24 0851 10/23/24 0851 10/23/24 0851 10/23/24 1200   97.9 °F (36.6 °C) 90 147/93 18 100 % Lying      Temp Source Heart Rate Source BP Location FiO2 (%) Pain Score    10/23/24  0851 10/23/24 0930 10/23/24 1200 -- 10/23/24 0851    Oral Monitor Right arm  No Pain      Vitals      Date and Time Temp Pulse SpO2 Resp BP Pain Score FACES Pain Rating User   10/23/24 1400 -- 87 -- 19 143/88 -- -- MI   10/23/24 1330 -- 79 97 % 20 130/83 -- -- LK   10/23/24 1230 -- 82 98 % 18 126/77 -- -- MI   10/23/24 1200 -- 74 99 % 17 111/72 -- -- SS   10/23/24 1130 -- 79 99 % 18 126/77 -- -- MI   10/23/24 1100 -- 83 100 % 19 144/79 -- -- SS   10/23/24 1030 -- 85 100 % 18 141/96 -- -- RD   10/23/24 1000 -- 74 100 % 20 133/86 -- -- RD   10/23/24 0930 -- 83 100 % 20 125/72 -- -- RD   10/23/24 0851 97.9 °F (36.6 °C) 90 100 % 18 147/93 No Pain -- RN            Physical Exam  Vitals and nursing note reviewed.   Constitutional:       Appearance: Normal appearance. He is normal weight.   HENT:      Head: Normocephalic.      Right Ear: External ear normal.      Left Ear: External ear normal.      Nose: Nose normal.      Mouth/Throat:      Mouth: Mucous membranes are moist.      Pharynx: Oropharynx is clear.   Eyes:      Extraocular Movements: Extraocular movements intact.      Conjunctiva/sclera: Conjunctivae normal.      Pupils: Pupils are equal, round, and reactive to light.   Cardiovascular:      Rate and Rhythm: Normal rate and regular rhythm.      Pulses: Normal pulses.      Heart sounds: Normal heart sounds. No murmur heard.     No friction rub.   Pulmonary:      Effort: Pulmonary effort is normal. No respiratory distress.      Breath sounds: Normal breath sounds. No stridor. No wheezing, rhonchi or rales.   Chest:      Chest wall: No tenderness.   Abdominal:      General: Abdomen is flat. Bowel sounds are normal. There is no distension.      Palpations: Abdomen is soft. There is no mass.      Tenderness: There is no abdominal tenderness. There is no right CVA tenderness, left CVA tenderness, guarding or rebound.      Hernia: No hernia is present.   Musculoskeletal:         General: No swelling, tenderness,  deformity or signs of injury. Normal range of motion.      Cervical back: Normal range of motion and neck supple.      Right lower leg: No edema.      Left lower leg: No edema.   Skin:     General: Skin is warm.      Capillary Refill: Capillary refill takes less than 2 seconds.   Neurological:      General: No focal deficit present.      Mental Status: He is alert and oriented to person, place, and time. Mental status is at baseline.      GCS: GCS eye subscore is 4. GCS verbal subscore is 5. GCS motor subscore is 6.   Psychiatric:         Mood and Affect: Mood normal.         Behavior: Behavior normal.         Thought Content: Thought content normal.         Judgment: Judgment normal.         Results Reviewed       Procedure Component Value Units Date/Time    Rapid drug screen, urine [[id number]]  (Abnormal) Collected: 10/23/24 1323    Lab Status: Final result Specimen: Urine, Clean Catch Updated: 10/23/24 1338     Amph/Meth UR Positive     Barbiturate Ur Negative     Benzodiazepine Urine Negative     Cocaine Urine Negative     Methadone Urine Negative     Opiate Urine Negative     PCP Ur Negative     THC Urine Negative     Oxycodone Urine Negative     Fentanyl Urine Positive     HYDROCODONE URINE Negative    Narrative:      Presumptive report. If requested, specimen will be sent to reference lab for confirmation.  FOR MEDICAL PURPOSES ONLY.   IF CONFIRMATION NEEDED PLEASE CONTACT THE LAB WITHIN 5 DAYS.    Drug Screen Cutoff Levels:  AMPHETAMINE/METHAMPHETAMINES  1000 ng/mL  BARBITURATES     200 ng/mL  BENZODIAZEPINES     200 ng/mL  COCAINE      300 ng/mL  METHADONE      300 ng/mL  OPIATES      300 ng/mL  PHENCYCLIDINE     25 ng/mL  THC       50 ng/mL  OXYCODONE      100 ng/mL  FENTANYL      5 ng/mL  HYDROCODONE     300 ng/mL    Ethanol [[id number]]  (Normal) Collected: 10/23/24 0926    Lab Status: Final result Specimen: Blood from Arm, Right Updated: 10/23/24 0951     Ethanol Lvl <10 mg/dL     Basic metabolic panel  [[id number]] Collected: 10/23/24 0926    Lab Status: Final result Specimen: Blood from Arm, Right Updated: 10/23/24 0947     Sodium 139 mmol/L      Potassium 3.8 mmol/L      Chloride 104 mmol/L      CO2 30 mmol/L      ANION GAP 5 mmol/L      BUN 12 mg/dL      Creatinine 0.68 mg/dL      Glucose 92 mg/dL      Calcium 9.3 mg/dL      eGFR 129 ml/min/1.73sq m     Narrative:      National Kid[employer] Foundation guidelines for Chronic Kidney Disease (CKD):     Stage 1 with normal or high GFR (GFR > 90 mL/min/1.73 square meters)    Stage 2 Mild CKD (GFR = 60-89 mL/min/1.73 square meters)    Stage 3A Moderate CKD (GFR = 45-59 mL/min/1.73 square meters)    Stage 3B Moderate CKD (GFR = 30-44 mL/min/1.73 square meters)    Stage 4 Severe CKD (GFR = 15-29 mL/min/1.73 square meters)    Stage 5 End Stage CKD (GFR <15 mL/min/1.73 square meters)  Note: GFR calculation is accurate only with a steady state creatinine    CBC and differential [[id number]]  (Abnormal) Collected: 10/23/24 0926    Lab Status: Final result Specimen: Blood from Arm, Right Updated: 10/23/24 0932     WBC 6.82 Thousand/uL      RBC 4.68 Million/uL      Hemoglobin 13.9 g/dL      Hematocrit 41.4 %      MCV 89 fL      MCH 29.7 pg      MCHC 33.6 g/dL      RDW 12.5 %      MPV 8.6 fL      Platelets 353 Thousands/uL      nRBC 0 /100 WBCs      Segmented % 61 %      Immature Grans % 1 %      Lymphocytes % 24 %      Monocytes % 10 %      Eosinophils Relative 3 %      Basophils Relative 1 %      Absolute Neutrophils 4.27 Thousands/µL      Absolute Immature Grans 0.04 Thousand/uL      Absolute Lymphocytes 1.63 Thousands/µL      Absolute Monocytes 0.65 Thousand/µL      Eosinophils Absolute 0.17 Thousand/µL      Basophils Absolute 0.06 Thousands/µL             No orders to display       ECG 12 Lead Documentation Only    Date/Time: 10/23/2024 10:32 AM    Performed by: SHANNAN Mohan  Authorized by: SHANNAN Mohan    Indications / Diagnosis:  Alcohol w/d  ECG  reviewed by me, the ED Provider: yes    Patient location:  ED  Previous ECG:     Previous ECG:  Compared to current    Similarity:  Changes noted  Interpretation:     Interpretation: normal    Rate:     ECG rate:  80    ECG rate assessment: normal    Rhythm:     Rhythm: sinus rhythm    Ectopy:     Ectopy: none    QRS:     QRS axis:  Normal  Conduction:     Conduction: normal    ST segments:     ST segments:  Normal  T waves:     T waves: normal        ED Medication and Procedure Management   Prior to Admission Medications   Prescriptions Last Dose Informant Patient Reported? Taking?   folic acid (FOLVITE) 1 mg tablet   No No   Sig: Take 1 tablet (1 mg total) by mouth daily   thiamine 100 MG tablet   No No   Sig: Take 1 tablet (100 mg total) by mouth daily      Facility-Administered Medications: None     Discharge Medication List as of 10/23/2024  2:46 PM        CONTINUE these medications which have NOT CHANGED    Details   folic acid (FOLVITE) 1 mg tablet Take 1 tablet (1 mg total) by mouth daily, Starting Sat 8/13/2022, No Print      thiamine 100 MG tablet Take 1 tablet (100 mg total) by mouth daily, Starting Sat 8/13/2022, No Print           No discharge procedures on file.  ED SEPSIS DOCUMENTATION   Time reflects when diagnosis was documented in both MDM as applicable and the Disposition within this note       Time User Action Codes Description Comment    10/23/2024  1:55 PM Lidia Garces [F19.10] Drug abuse (HCC)     10/23/2024  1:55 PM Lidia Garces [F10.10] Alcohol abuse     10/23/2024  1:55 PM Lidia Garces [Z59.00] Homeless                  SHANNAN Mohan  10/23/24 8023

## 2024-10-23 NOTE — ED NOTES
Called patient's name multiple times, patient asleep and not answering. RN spoke louder 2 more times and patient woke up.      Lillie Nair RN  10/23/24 7825

## 2024-10-24 NOTE — ED CARE HANDOFF
[facility] Warm Handoff Outcome Note    Patient name Paul Phelps  Location ED 07/ED 07 MRN [id number]  Age: 28 y.o.          Plan Type:  Warm Handoff                                                                                    Plan Date: 10/23/2024  Service:  ED Warm Handoff      Substance Use History:  Opiates    Warm Handoff Update:  Pt transported to [facility]    Warm Handoff Outcome: Residential  Inpatient